# Patient Record
Sex: FEMALE | Race: WHITE | Employment: FULL TIME | URBAN - METROPOLITAN AREA
[De-identification: names, ages, dates, MRNs, and addresses within clinical notes are randomized per-mention and may not be internally consistent; named-entity substitution may affect disease eponyms.]

---

## 2018-04-19 ENCOUNTER — HOSPITAL ENCOUNTER (OUTPATIENT)
Dept: GENERAL RADIOLOGY | Age: 58
Discharge: HOME | End: 2018-04-19
Attending: INTERNAL MEDICINE
Payer: COMMERCIAL

## 2018-04-19 ENCOUNTER — HOSPITAL ENCOUNTER (OUTPATIENT)
Dept: CT IMAGING | Age: 58
Discharge: HOME | End: 2018-04-19
Attending: INTERNAL MEDICINE
Payer: COMMERCIAL

## 2018-04-19 DIAGNOSIS — R59.0 CERVICAL LYMPHADENOPATHY: ICD-10-CM

## 2018-04-19 DIAGNOSIS — R59.0 LOCALIZED ENLARGED LYMPH NODES: ICD-10-CM

## 2018-04-19 DIAGNOSIS — R61 GENERALIZED HYPERHIDROSIS: ICD-10-CM

## 2018-04-19 DIAGNOSIS — G44.029 CHRONIC CLUSTER HEADACHE, NOT INTRACTABLE: ICD-10-CM

## 2018-04-19 PROCEDURE — 71046 X-RAY EXAM CHEST 2 VIEWS: CPT

## 2018-04-19 PROCEDURE — 70450 CT HEAD/BRAIN W/O DYE: CPT

## 2018-08-14 ENCOUNTER — OFFICE VISIT (OUTPATIENT)
Dept: ORTHOPEDIC SURGERY | Age: 58
End: 2018-08-14

## 2018-08-14 VITALS
BODY MASS INDEX: 26.31 KG/M2 | SYSTOLIC BLOOD PRESSURE: 130 MMHG | HEIGHT: 60 IN | WEIGHT: 134 LBS | RESPIRATION RATE: 16 BRPM | OXYGEN SATURATION: 97 % | TEMPERATURE: 97.6 F | HEART RATE: 76 BPM | DIASTOLIC BLOOD PRESSURE: 64 MMHG

## 2018-08-14 DIAGNOSIS — M25.512 LEFT SHOULDER PAIN, UNSPECIFIED CHRONICITY: ICD-10-CM

## 2018-08-14 DIAGNOSIS — M75.02 ADHESIVE CAPSULITIS OF LEFT SHOULDER: Primary | ICD-10-CM

## 2018-08-14 RX ORDER — DICLOFENAC SODIUM 10 MG/G
2 GEL TOPICAL 4 TIMES DAILY
Qty: 1 EACH | Refills: 1 | Status: SHIPPED | OUTPATIENT
Start: 2018-08-14 | End: 2019-10-28

## 2018-08-14 RX ORDER — FLUOXETINE 20 MG/1
20 TABLET ORAL DAILY
COMMUNITY

## 2018-08-14 RX ORDER — CYANOCOBALAMIN (VITAMIN B-12) 2500 MCG
TABLET, SUBLINGUAL SUBLINGUAL
COMMUNITY

## 2018-08-14 NOTE — PROGRESS NOTES
Feliciano Garcia  1960   Chief Complaint   Patient presents with    Shoulder Pain     left shoulder pain        HISTORY OF PRESENT ILLNESS  Feliciano Garcia is a 61 y.o. female who presents today for evaluation of left shoulder pain. she rates her pain 7/10 today. Pain has been present for 2 months. Denies a specific injury but is very active. Patient describes the pain as sharp that is Intermittent in nature. Symptoms are worse with lifting overhead, certain movements of the arm, Activity and is better with  Rest. Associated symptoms include stiffness. Since problem started, it: has worsened. Pain does wake patient up at night. Has taken no meds for the problem. Has tried following treatments: Injections:NO; Brace:NO; Therapy:NO; Cane/Crutch:NO       Allergies   Allergen Reactions    Iodine Anaphylaxis    Nsaids (Non-Steroidal Anti-Inflammatory Drug) Other (comments)     Unable to take r/t gastric bypass        Past Medical History:   Diagnosis Date    Thyroid disease       Social History     Social History    Marital status:      Spouse name: N/A    Number of children: N/A    Years of education: N/A     Occupational History    Not on file. Social History Main Topics    Smoking status: Former Smoker    Smokeless tobacco: Never Used    Alcohol use No    Drug use: No    Sexual activity: Not on file     Other Topics Concern    Not on file     Social History Narrative      Past Surgical History:   Procedure Laterality Date    HX BREAST AUGMENTATION      lift     HX  SECTION      x2    HX GASTRIC BYPASS      HX HEENT      TMJ Sx     HX OTHER SURGICAL      Several Sx on Scalp from Iodine burn on forehead     HX OTHER SURGICAL      arm lift      History reviewed. No pertinent family history. Current Outpatient Prescriptions   Medication Sig    ginkgo biloba (GINKOBA PO) Take  by mouth.     Liraglutide (VICTOZA) 0.6 mg/0.1 mL (18 mg/3 mL) pnij 0.6 mg by SubCUTAneous route.    FLUoxetine (PROZAC) 20 mg tablet Take 20 mg by mouth daily.  cyanocobalamin, vitamin B-12, (VITAMIN B-12 PO) Take  by mouth.  biotin 5,000 mcg subl by SubLINGual route.  levothyroxine (SYNTHROID) 125 mcg tablet Take  by mouth Daily (before breakfast).  multivitamin (ONE A DAY) tablet Take 1 tablet by mouth daily. No current facility-administered medications for this visit. REVIEW OF SYSTEM   Patient denies: Weight loss, Fever/Chills, HA, Visual changes, Fatigue, Chest pain, SOB, Abdominal pain, N/V/D/C, Blood in stool or urine, Edema. Pertinent positive as above in HPI. All others were negative    PHYSICAL EXAM:   Visit Vitals    /64    Pulse 76    Temp 97.6 °F (36.4 °C) (Oral)    Resp 16    Ht 5' (1.524 m)    Wt 134 lb (60.8 kg)    SpO2 97%    BMI 26.17 kg/m2     The patient is a well-developed, well-nourished female   in no acute distress. The patient is alert and oriented times three. The patient is alert and oriented times three. Mood and affect are normal.  LYMPHATIC: lymph nodes are not enlarged and are within normal limits  SKIN: normal in color and non tender to palpation. There are no bruises or abrasions noted. NEUROLOGICAL: Motor sensory exam is within normal limits. Reflexes are equal bilaterally.  There is normal sensation to pinprick and light touch  MUSCULOSKELETAL:  Examination Left shoulder   Skin Intact   AC joint tenderness -   Biceps tenderness -   Forward flexion/Elevation    Active abduction    Glenohumeral abduction 45   External rotation ROM 45   Internal rotation ROM 30   Apprehension -   Faribas Relocation -   Jerk -   Load and Shift -   Obriens -   Speeds -   Impingement sign -   Supraspinatus/Empty Can -, 5/5   External Rotation Strength -, 5/5   Lift Off/Belly Press -, 5/5   Neurovascular Intact       IMAGING: XR of left shoulder dated 8/14/18 was reviewed and read: mild degenerative changes in the [facility] joint IMPRESSION:      ICD-10-CM ICD-9-CM    1. Adhesive capsulitis of left shoulder M75.02 726.0 REFERRAL TO PHYSICAL THERAPY      diclofenac (VOLTAREN) 1 % gel   2. Left shoulder pain, unspecified chronicity M25.512 719.41 AMB POC XRAY, SHOULDER; COMPLETE, 2+        PLAN:  1. The patient presents today with left shoulder pain due to adhesive capsulitis. Risk factors include: n/a  2. No ultrasound exam indicated today  3. No cortisone injection indicated today   4. Yes Physical Therapy indicated today  5. No diagnostic test indicated today:   6. No durable medical equipment indicated today  7. No referral indicated today   8. Yes medications indicated today: VOLTAREN GEL  9. No Narcotic indicated today       RTC 4-6 weeks  Follow-up Disposition: Not on File    Scribed by Elizabeth Lindsey [address]) as dictated by Aster Linder MD    I, Dr. Aster Linder, confirm that all documentation is accurate.     Aster Linder M.D.   [facility]

## 2018-08-27 ENCOUNTER — HOSPITAL ENCOUNTER (OUTPATIENT)
Dept: PHYSICAL THERAPY | Age: 58
Discharge: HOME | End: 2018-08-27
Payer: COMMERCIAL

## 2018-08-27 PROCEDURE — 97140 MANUAL THERAPY 1/> REGIONS: CPT

## 2018-08-27 PROCEDURE — 97162 PT EVAL MOD COMPLEX 30 MIN: CPT

## 2018-08-27 PROCEDURE — 97110 THERAPEUTIC EXERCISES: CPT

## 2018-08-27 NOTE — PROGRESS NOTES
PT DAILY TREATMENT NOTE 8    Patient Name: Jo Wagner  Date:2018  : 1960  [x]  Patient  Verified  Payor: Yin Johnson / [place] [employer] / Product Type: PPO /    In time:408  Out time:440  Total Treatment Time (min): 32  Visit #: 1 of 8    Treatment Area: Adhesive capsulitis of left shoulder [M75.02]    SUBJECTIVE  Pain Level (0-10 scale): 7  Any medication changes, allergies to medications, adverse drug reactions, diagnosis change, or new procedure performed?: [x] No    [] Yes (see summary sheet for update)  Subjective functional status/changes:   [] No changes reported  Left shoulder pain with difficulty reaching behind her back to zip up her work clothes. OBJECTIVE  8 min Manual Therapy:  Per flow sheet   Rationale: decrease pain, increase ROM and increase tissue extensibility to improve flexion and IR  15 min Therapeutic Exercise:  [x] See flow sheet :   Rationale: increase ROM to improve the patients ability to increase functional reach            With   [] TE   [] TA   [] neuro   [] other: Patient Education: [x] Review HEP    [] Progressed/Changed HEP based on:   [] positioning   [] body mechanics   [] transfers   [] heat/ice application    [] other:      Other Objective/Functional Measures: refer to eval     Pain Level (0-10 scale) post treatment: 5    ASSESSMENT/Changes in Function: Improved pain and mobility after stretching    Patient will continue to benefit from skilled PT services to modify and progress therapeutic interventions, address functional mobility deficits, address ROM deficits, address strength deficits, analyze and address soft tissue restrictions, analyze and cue movement patterns, analyze and modify body mechanics/ergonomics and assess and modify postural abnormalities to attain remaining goals. []  See Plan of Care  []  See progress note/recertification  []  See Discharge Summary         Progress towards goals / Updated goals:  Short Term Goals:  To be accomplished in 2 weeks:  1. Pt will be compliant with HEP 3x/day to improve shoulder flexion and IR to increase ease of dressing  2. Pt will increase FIR of left shoulder to L5 to increase ease of dressing     Long Term Goals: To be accomplished in 4 weeks:  1. Pt will improve FOTO to 70 to increase ease of ADLs  2. Pt will increase FIR to T10 to be able to zip up dresses without assistance  3.   Pt will improve gross left shoulder strength to 3+/5 to increase ease of ADLs    PLAN  []  Upgrade activities as tolerated     [x]  Continue plan of care  []  Update interventions per flow sheet       []  Discharge due to:_  []  Other:_      Judge Ho, PT 8/27/2018  4:48 PM    Future Appointments  Date Time Provider Lilliam Byrnes   8/29/2018 1:30 PM Nell Reyes, PTA [facility]   9/4/2018 2:30 PM Nell Reyes, PTA [facility]   9/6/2018 1:00 PM Alonzo Mónica, PTA [facility]   9/11/2018 2:30 PM Nell Reyes, PTA [facility]   9/13/2018 3:30 PM Alonzo Mónica, PTA [facility]   9/17/2018 3:30 PM Judge Ho, PT [facility]   9/19/2018 2:00 PM Alonzo Mónica, PTA [facility]

## 2018-08-27 NOTE — PROGRESS NOTES
[facility] [address]  [place], [address]  [phone] fax    Plan of Care/ Statement of Necessity for Physical Therapy Services    Patient name: Nilson Andrews Start of Care: 2018   Referral source: Elin Lawson : 1960    Medical Diagnosis: Adhesive capsulitis of left shoulder [M75.02]   Onset Date:3-4 months ago    Treatment Diagnosis: left shoulder pain   Prior Hospitalization: see medical history Provider#: [id number]   Medications: Verified on Patient summary List    Comorbidities: gastric bypass, allergies, knee arthroscopy   Prior Level of Function: RHD female with full AROM (B) shoulders     The Plan of Care and following information is based on the information from the initial evaluation. Assessment/ key information: Pt presents with decreased left shoulder AROM affecting reaching, bathing, and dressing. Pt has poor posterior capsular mobility and likely RTC tendonitis. Continue PT to address the above findings to control pain and increase ease of ADLs  Evaluation Complexity History MEDIUM  Complexity : 1-2 comorbidities / personal factors will impact the outcome/ POC ; Examination MEDIUM Complexity : 3 Standardized tests and measures addressing body structure, function, activity limitation and / or participation in recreation  ;Presentation MEDIUM Complexity : Evolving with changing characteristics  ; Clinical Decision Making MEDIUM Complexity : FOTO score of 26-74  Overall Complexity Rating: MEDIUM  Problem List: pain affecting function, decrease ROM, decrease strength, decrease ADL/ functional abilitiies and decrease activity tolerance   Treatment Plan may include any combination of the following: Therapeutic exercise, Therapeutic activities, Neuromuscular re-education, Physical agent/modality, Gait/balance training, Manual therapy and Patient education  Patient / Family readiness to learn indicated by: asking questions, trying to perform skills and interest  Persons(s) to be included in education: patient (P)  Barriers to Learning/Limitations: None  Patient Goal (s): To be able to zip up my dresses  Patient Self Reported Health Status: good  Rehabilitation Potential: good    Short Term Goals: To be accomplished in 2 weeks:  1. Pt will be compliant with HEP 3x/day to improve shoulder flexion and IR to increase ease of dressing  2. Pt will increase FIR of left shoulder to L5 to increase ease of dressing    Long Term Goals: To be accomplished in 4 weeks:  1. Pt will improve FOTO to 70 to increase ease of ADLs  2. Pt will increase FIR to T10 to be able to zip up dresses without assistance  3. Pt will improve gross left shoulder strength to 3+/5 to increase ease of ADLs     Frequency / Duration: Patient to be seen 2 times per week for 4 weeks. Patient/ Caregiver education and instruction: Diagnosis, prognosis, self care, activity modification and exercises   [x]  Plan of care has been reviewed with MARY Weiss PT 8/27/2018 4:44 PM    ________________________________________________________________________    I certify that the above Therapy Services are being furnished while the patient is under my care. I agree with the treatment plan and certify that this therapy is necessary.     Physician's Signature:____________Date:_________TIME:________    ** Signature, Date and Time must be completed for valid certification **    Please sign and return to [facility] [address]  [facility] [address]  [place], [address]  [phone] fax

## 2018-08-29 ENCOUNTER — HOSPITAL ENCOUNTER (OUTPATIENT)
Dept: PHYSICAL THERAPY | Age: 58
Discharge: HOME | End: 2018-08-29
Payer: COMMERCIAL

## 2018-08-29 PROCEDURE — 97110 THERAPEUTIC EXERCISES: CPT

## 2018-08-29 PROCEDURE — 97140 MANUAL THERAPY 1/> REGIONS: CPT

## 2018-08-29 NOTE — PROGRESS NOTES
PT DAILY TREATMENT NOTE  Patient Name: Hanna Nathan Date:2018 : 1960 [x]  Patient  Verified Payor: Nadira Fall / [place]: [place] [employer] / Product Type: PPO / In time:158  Out time:234 Total Treatment Time (min): 36 Visit #: 2 of 8 Treatment Area: Adhesive capsulitis of left shoulder [M75.02] SUBJECTIVE Pain Level (0-10 scale): 0 at rest; 7 with activity Any medication changes, allergies to medications, adverse drug reactions, diagnosis change, or new procedure performed?: [x] No    [] Yes (see summary sheet for update) Subjective functional status/changes:   [] No changes reported I've been doing my HEP. OBJECTIVE 28 min Therapeutic Exercise:  [] See flow sheet :  
Rationale: increase ROM to improve the patients ability to perform daily activities 8 min Manual Therapy:  Per flow sheet Rationale: decrease pain, increase ROM and increase tissue extensibility to perform ADLs With 
 [] TE 
 [] TA 
 [] neuro 
 [] other: Patient Education: [x] Review HEP [] Progressed/Changed HEP based on:  
[] positioning   [] body mechanics   [] transfers   [] heat/ice application   
[] other:   
 
Other Objective/Functional Measures:   
 
Pain Level (0-10 scale) post treatment: 6 \"achy\" ASSESSMENT/Changes in Function: Unable to tolerate s/l abd 2/2 pain. Poor humeral head inferior mobility noted with passive and active abd. Patient will continue to benefit from skilled PT services to modify and progress therapeutic interventions, address functional mobility deficits, address ROM deficits, address strength deficits, analyze and address soft tissue restrictions and analyze and cue movement patterns to attain remaining goals. []  See Plan of Care 
[]  See progress note/recertification 
[]  See Discharge Summary Progress towards goals / Updated goals: 
Short Term Goals: To be accomplished in 2 weeks: 1.  Pt will be compliant with HEP 3x/day to improve shoulder flexion and IR to increase ease of dressing 2.  Pt will increase FIR of left shoulder to L5 to increase ease of dressing progressing- able to get to L3 with pulleys 
   
Long Term Goals: To be accomplished in 4 weeks: 1.  Pt will improve FOTO to 70 to increase ease of ADLs 2.  Pt will increase FIR to T10 to be able to zip up dresses without assistance 3.  Pt will improve gross left shoulder strength to 3+/5 to increase ease of ADLs PLAN 
[]  Upgrade activities as tolerated     []  Continue plan of care 
[]  Update interventions per flow sheet      
[]  Discharge due to:_ 
[]  Other:_ Ab Savage, PT 8/29/2018  2:05 PM 
 
Future Appointments Date Time Provider Lilliam Byrnes 9/4/2018 2:30 PM Shimon Reyes, MARY [facility]  
9/6/2018 1:00 PM [facility], PTA [facility]  
9/11/2018 2:30 PM Shimon Reyes, PTA [facility]  
9/13/2018 3:30 PM [facility], PTA [facility]  
9/19/2018 2:00 PM [facility], PTA [facility]  
9/21/2018 12:00 PM Shaheen Morgan, PT [facility]

## 2018-09-06 ENCOUNTER — HOSPITAL ENCOUNTER (OUTPATIENT)
Dept: PHYSICAL THERAPY | Age: 58
Discharge: HOME | End: 2018-09-06
Payer: COMMERCIAL

## 2018-09-06 PROCEDURE — 97110 THERAPEUTIC EXERCISES: CPT

## 2018-09-06 PROCEDURE — 97140 MANUAL THERAPY 1/> REGIONS: CPT

## 2018-09-06 NOTE — PROGRESS NOTES
PT DAILY TREATMENT NOTE  Patient Name: Facundo Leos Date:2018 : 1960 [x]  Patient  Verified Payor: Iraida Llamas / [place]: [place] [employer] / Product Type: PPO / In time:1:00  Out time:1:33 Total Treatment Time (min): 33 Visit #: 3 of 8 Treatment Area: Adhesive capsulitis of left shoulder [M75.02] SUBJECTIVE Pain Level (0-10 scale): 7/10 Any medication changes, allergies to medications, adverse drug reactions, diagnosis change, or new procedure performed?: [x] No    [] Yes (see summary sheet for update) Subjective functional status/changes:   [] No changes reported Pt reports no new complaints of pain. OBJECTIVE 25 min Therapeutic Exercise:  [] See flow sheet :  
Rationale: increase ROM and increase strength to improve the patients ability to tolerate ADLs. 8 min Manual Therapy:  PROM to left shoulder, GHJ inferior/posterior mobs. Rationale: decrease pain, increase ROM and increase tissue extensibility to improve tolerance to ADLs. With 
 [] TE 
 [] TA 
 [] neuro 
 [] other: Patient Education: [x] Review HEP [] Progressed/Changed HEP based on:  
[] positioning   [] body mechanics   [] transfers   [] heat/ice application   
[] other:   
 
Other Objective/Functional Measures: Pain with end range shoulder abduction. Pain Level (0-10 scale) post treatment: 5/10 ASSESSMENT/Changes in Function: Pt demonstrates good available shoulder flexion but continues to have limitations through IR and abduction. Patient will continue to benefit from skilled PT services to modify and progress therapeutic interventions, address functional mobility deficits, address ROM deficits, address strength deficits, analyze and address soft tissue restrictions, analyze and cue movement patterns and analyze and modify body mechanics/ergonomics to attain remaining goals. []  See Plan of Care 
[]  See progress note/recertification 
[]  See Discharge Summary Progress towards goals / Updated goals: 
Short Term Goals: To be accomplished in 2 weeks: 1.  Pt will be compliant with HEP 3x/day to improve shoulder flexion and IR to increase ease of dressing- Pt reports compliance with HEP. 9/6/18 2.  Pt will increase FIR of left shoulder to L5 to increase ease of dressing progressing- able to get to L3 with pulleys 
   
Long Term Goals: To be accomplished in 4 weeks: 1.  Pt will improve FOTO to 70 to increase ease of ADLs 2.  Pt will increase FIR to T10 to be able to zip up dresses without assistance 3.  Pt will improve gross left shoulder strength to 3+/5 to increase ease of ADLs 
  
PLAN 
[]  Upgrade activities as tolerated     [x]  Continue plan of care 
[]  Update interventions per flow sheet      
[]  Discharge due to:_ 
[]  Other:_ Kortney Kan PTA 9/6/2018  1:05 PM 
 
Future Appointments Date Time Provider Lilliam Byrnes 9/11/2018 2:30 PM Destinee Reyes, PTA [facility]  
9/13/2018 3:30 PM Kortney Kan PTA [facility]  
9/19/2018 2:00 PM Kortney Kan, PTA [facility]  
9/21/2018 12:00 PM Portillo Jones, PT [facility]  
9/25/2018 4:30 PM Kortney Kan, PTA [facility]

## 2018-09-11 ENCOUNTER — HOSPITAL ENCOUNTER (OUTPATIENT)
Dept: PHYSICAL THERAPY | Age: 58
Discharge: HOME | End: 2018-09-11
Payer: COMMERCIAL

## 2018-09-11 PROCEDURE — 97110 THERAPEUTIC EXERCISES: CPT

## 2018-09-11 PROCEDURE — 97140 MANUAL THERAPY 1/> REGIONS: CPT

## 2018-09-11 NOTE — PROGRESS NOTES
PT DAILY TREATMENT NOTE  Patient Name: Ruthy Parker Date:2018 : 1960 [x]  Patient  Verified Payor: Jo Ann Calixto / [place]: [place] [employer] / Product Type: PPO / In time:1:57  Out time:2:43 Total Treatment Time (min): 46 Visit #: 4 of 8 Treatment Area: Adhesive capsulitis of left shoulder [M75.02] SUBJECTIVE Pain Level (0-10 scale): 6 Any medication changes, allergies to medications, adverse drug reactions, diagnosis change, or new procedure performed?: [x] No    [] Yes (see summary sheet for update) Subjective functional status/changes:   [] No changes reported Pt reports feeling a little better today OBJECTIVE 38 min Therapeutic Exercise:  [x] See flow sheet :  
Rationale: increase ROM and increase strength to improve the patients ability to perform ADLs 8 min Manual Therapy:  Post/if GH jt mobs, DTM + TPR to medial border of scap Rationale: decrease pain, increase ROM, increase tissue extensibility and decrease trigger points to perform daily tasks With 
 [] TE 
 [] TA 
 [] neuro 
 [] other: Patient Education: [x] Review HEP [] Progressed/Changed HEP based on:  
[] positioning   [] body mechanics   [] transfers   [] heat/ice application   
[] other:   
 
Other Objective/Functional Measures:  
Difficulty with stretching into abd due to reports of \"getting stuck\" Pain Level (0-10 scale) post treatment: 5 
 
ASSESSMENT/Changes in Function: Held S/L active abd due to pt reports of shoulder getting \"stuck\". Demo's good tolerance to passive stretching.   
 
Patient will continue to benefit from skilled PT services to modify and progress therapeutic interventions, address functional mobility deficits, address ROM deficits, analyze and address soft tissue restrictions, analyze and cue movement patterns and assess and modify postural abnormalities to attain remaining goals. []  See Plan of Care 
[]  See progress note/recertification 
[]  See Discharge Summary Progress towards goals / Updated goals: 
Short Term Goals: To be accomplished in 2 weeks: 1.  Pt will be compliant with HEP 3x/day to improve shoulder flexion and IR to increase ease of dressing- Pt reports compliance with HEP. 9/6/18 2.  Pt will increase FIR of left shoulder to L5 to increase ease of dressing progressing- able to get to L3 with pulleys 
   
Long Term Goals: To be accomplished in 4 weeks: 1.  Pt will improve FOTO to 70 to increase ease of ADLs 2.  Pt will increase FIR to T10 to be able to zip up dresses without assistance 3.  Pt will improve gross left shoulder strength to 3+/5 to increase ease of ADLs PLAN [x]  Upgrade activities as tolerated     [x]  Continue plan of care 
[]  Update interventions per flow sheet      
[]  Discharge due to:_ 
[]  Other:_ Marybeth Silver PTA 9/11/2018  1:59 PM 
 
Future Appointments Date Time Provider Lilliam Byrnes 9/11/2018 2:00 PM Syeda Reyes [facility]  
9/13/2018 3:30 PM [facility]  
9/19/2018 2:00 PM [facility]  
9/21/2018 12:00 [facility]  
9/25/2018 4:30 [facility]

## 2018-09-13 ENCOUNTER — HOSPITAL ENCOUNTER (OUTPATIENT)
Dept: PHYSICAL THERAPY | Age: 58
Discharge: HOME | End: 2018-09-13
Payer: COMMERCIAL

## 2018-09-13 PROCEDURE — 97110 THERAPEUTIC EXERCISES: CPT

## 2018-09-13 PROCEDURE — 97112 NEUROMUSCULAR REEDUCATION: CPT

## 2018-09-13 PROCEDURE — 97140 MANUAL THERAPY 1/> REGIONS: CPT

## 2018-09-13 NOTE — PROGRESS NOTES
PT DAILY TREATMENT NOTE  Patient Name: Roland Cochran Date:2018 : 1960 [x]  Patient  Verified Payor: Mike Blum / Plan: [place] [employer] [place] / Product Type: PPO / In time:12:05  Out time:12:45 Total Treatment Time (min): 40 Visit #: 5 of 8 Treatment Area: Adhesive capsulitis of left shoulder [M75.02] SUBJECTIVE Pain Level (0-10 scale): 0 at rest, 6 when active Any medication changes, allergies to medications, adverse drug reactions, diagnosis change, or new procedure performed?: [x] No    [] Yes (see summary sheet for update) Subjective functional status/changes:   [] No changes reported Pt notes that her shoulder continues to hurt with general UE movements. OBJECTIVE 20 min Therapeutic Exercise:  [x] See flow sheet :  
Rationale: increase ROM, increase strength and improve coordination to improve the patients ability to increase ease with functional tasks 10 min Neuromuscular Re-education:  [x]  See flow sheet :scapular stabilization exercises Rationale: increase strength, improve coordination and increase proprioception  to improve the patients ability to increase ease with overhead reaching 10 min Manual Therapy:  Posterior and inferior HG jt mobs grade 3, PROM in all directions with distraction, STM to scapular musculature Rationale: decrease pain, increase ROM and increase tissue extensibility to increase ease with ADLs With 
 [] TE 
 [] TA 
 [] neuro 
 [] other: Patient Education: [x] Review HEP [] Progressed/Changed HEP based on:  
[] positioning   [] body mechanics   [] transfers   [] heat/ice application   
[] other:   
 
  
 
Pain Level (0-10 scale) post treatment: 0 at rest, 5 with movement ASSESSMENT/Changes in Function: Pt instructed in scapular setting to decrease catching when performing S/L exercises. Pt notes that that it helped the movement seem more smooth and controled.  Pt continues to have good tolerance with manual and active stretching. Patient will continue to benefit from skilled PT services to modify and progress therapeutic interventions, address functional mobility deficits, address ROM deficits, address strength deficits, analyze and address soft tissue restrictions, analyze and cue movement patterns, analyze and modify body mechanics/ergonomics and instruct in home and community integration to attain remaining goals. [x]  See Plan of Care 
[]  See progress note/recertification 
[]  See Discharge Summary Progress towards goals / Updated goals: 
 
Short Term Goals: To be accomplished in 2 weeks: 1.  Pt will be compliant with HEP 3x/day to improve shoulder flexion and IR to increase ease of dressing- Pt reports compliance with HEP. 9/6/18 2.  Pt will increase FIR of left shoulder to L5 to increase ease of dressing progressing- able to get to L3 with pulleys 
   
Long Term Goals: To be accomplished in 4 weeks: 1.  Pt will improve FOTO to 70 to increase ease of ADLs 2.  Pt will increase FIR to T10 to be able to zip up dresses without assistance 3.  Pt will improve gross left shoulder strength to 3+/5 to increase ease of ADLs 
  
 
PLAN [x]  Upgrade activities as tolerated     [x]  Continue plan of care 
[]  Update interventions per flow sheet      
[]  Discharge due to:_ 
[]  Other:_ Maverick Wooten 9/13/2018  12:46 PM 
 
Future Appointments Date Time Provider Lilliam Byrnes 9/19/2018 2:00 PM Kortney Kan PTA [facility]  
9/21/2018 12:00 PM [facility]  
9/25/2018 4:30 PM Kortney Kan PTA [facility]

## 2018-09-17 ENCOUNTER — APPOINTMENT (OUTPATIENT)
Dept: PHYSICAL THERAPY | Age: 58
End: 2018-09-17
Payer: COMMERCIAL

## 2018-09-19 ENCOUNTER — HOSPITAL ENCOUNTER (OUTPATIENT)
Dept: PHYSICAL THERAPY | Age: 58
Discharge: HOME | End: 2018-09-19
Payer: COMMERCIAL

## 2018-09-19 PROCEDURE — 97110 THERAPEUTIC EXERCISES: CPT

## 2018-09-19 PROCEDURE — 97140 MANUAL THERAPY 1/> REGIONS: CPT

## 2018-09-19 NOTE — PROGRESS NOTES
PT DAILY TREATMENT NOTE  Patient Name: Cindy Arce Date:2018 : 1960 [x]  Patient  Verified Payor: Michael Markham / Plan: [place] [employer] / Product Type: PPO / In time:2:10  Out time:2:45 Total Treatment Time (min): 35 Visit #: 6 of 8 Treatment Area: Adhesive capsulitis of left shoulder [M75.02] SUBJECTIVE Pain Level (0-10 scale): 4/10 Any medication changes, allergies to medications, adverse drug reactions, diagnosis change, or new procedure performed?: [x] No    [] Yes (see summary sheet for update) Subjective functional status/changes:   [] No changes reported Pt reports no new complaints of pain. Pt reports compliance with HEP. OBJECTIVE 27 min Therapeutic Exercise:  [x] See flow sheet :  
Rationale: increase ROM and increase strength to improve the patients ability to tolerate ADLs. 8 min Manual Therapy:  PROM to left shoulder Rationale: decrease pain, increase ROM and increase tissue extensibility to improve tolerance to ADLs. With 
 [] TE 
 [] TA 
 [] neuro 
 [] other: Patient Education: [x] Review HEP [] Progressed/Changed HEP based on:  
[] positioning   [] body mechanics   [] transfers   [] heat/ice application   
[] other:   
 
Other Objective/Functional Measures: Pt continues to perform shoulder hike to achieve shoulder abduction. Pain Level (0-10 scale) post treatment: 0/10 ASSESSMENT/Changes in Function: Pt continues to demonstrate right rotator cuff weakness. Patient will continue to benefit from skilled PT services to modify and progress therapeutic interventions, address functional mobility deficits, address ROM deficits, address strength deficits, analyze and address soft tissue restrictions, analyze and cue movement patterns and analyze and modify body mechanics/ergonomics to attain remaining goals. []  See Plan of Care 
[]  See progress note/recertification 
[]  See Discharge Summary Progress towards goals / Updated goals: 
  
Short Term Goals: To be accomplished in 2 weeks: 1.  Pt will be compliant with HEP 3x/day to improve shoulder flexion and IR to increase ease of dressing- Pt reports compliance with HEP. 9/6/18 2.  Pt will increase FIR of left shoulder to L5 to increase ease of dressing progressing- able to get to L3 with pulleys 
   
Long Term Goals: To be accomplished in 4 weeks: 1.  Pt will improve FOTO to 70 to increase ease of ADLs 2.  Pt will increase FIR to T10 to be able to zip up dresses without assistance- progressing with pulleys. 9/19/18 3.  Pt will improve gross left shoulder strength to 3+/5 to increase ease of ADLs PLAN 
[]  Upgrade activities as tolerated     [x]  Continue plan of care 
[]  Update interventions per flow sheet      
[]  Discharge due to:_ 
[]  Other:_ Juan Phelan PTA 9/19/2018  2:15 PM 
 
Future Appointments Date Time Provider Lilliam Byrnes 9/21/2018 12:00 PM [facility]  
9/25/2018 4:30 PM [facility]

## 2018-09-21 ENCOUNTER — HOSPITAL ENCOUNTER (OUTPATIENT)
Dept: PHYSICAL THERAPY | Age: 58
Discharge: HOME | End: 2018-09-21
Payer: COMMERCIAL

## 2018-09-21 PROCEDURE — 97140 MANUAL THERAPY 1/> REGIONS: CPT

## 2018-09-21 PROCEDURE — 97110 THERAPEUTIC EXERCISES: CPT

## 2018-09-21 NOTE — PROGRESS NOTES
PT DAILY TREATMENT NOTE  Patient Name: Roland Cochran Date:2018 : 1960 [x]  Patient  Verified Payor: Mike Blum / Plan: [employer] / Product Type: PPO / In time:12:05  Out time:12:45 Total Treatment Time (min): 40 Visit #: 7 of 8 Treatment Area: Adhesive capsulitis of left shoulder [M75.02] SUBJECTIVE Pain Level (0-10 scale): 4/10 Any medication changes, allergies to medications, adverse drug reactions, diagnosis change, or new procedure performed?: [x] No    [] Yes (see summary sheet for update) Subjective functional status/changes:   [x] No changes reported OBJECTIVE 30 min Therapeutic Exercise:  [x] See flow sheet :  
Rationale: increase ROM, increase strength and increase proprioception to improve the patients ability to perform ADL's. 10 min Manual Therapy:  Left ST/GH joint mobs, DTM/TPR Left UT, lev scap, infraspinatus, pec major/minor. Shoulder PROM. Rationale: decrease pain, increase ROM, increase tissue extensibility and decrease trigger points to improve ease of [place] activities. With 
 [x] TE 
 [] TA 
 [] neuro 
 [] other: Patient Education: [x] Review HEP [] Progressed/Changed HEP based on:  
[] positioning   [] body mechanics   [] transfers   [] heat/ice application   
[] other:   
 
Other Objective/Functional Measures: No change in there ex. Pain Level (0-10 scale) post treatment: 0/10 ASSESSMENT/Changes in Function:  FOTO 61%. TTP Left pec major/minor. Pt reports improved ease with ADL's, limited abd ROM. Patient will continue to benefit from skilled PT services to modify and progress therapeutic interventions, address functional mobility deficits, address ROM deficits, address strength deficits, analyze and address soft tissue restrictions and analyze and modify body mechanics/ergonomics to attain remaining goals. [x]  See Plan of Care 
[]  See progress note/recertification 
[]  See Discharge Summary Progress towards goals / Updated goals: 
Short Term Goals: To be accomplished in 2 weeks: 1.  Pt will be compliant with HEP 3x/day to improve shoulder flexion and IR to increase ease of dressing- Pt reports compliance with HEP. 9/6/18 2.  Pt will increase FIR of left shoulder to L5 to increase ease of dressing progressing- able to get to L3 with pulleys 
   
Long Term Goals: To be accomplished in 4 weeks: 1.  Pt will improve FOTO to 70 to increase ease of ADLs - FOTO 61%. 9/21/2018 2.  Pt will increase FIR to T10 to be able to zip up dresses without assistance- progressing with pulleys. 9/19/18 3.  Pt will improve gross left shoulder strength to 3+/5 to increase ease of ADLs PLAN 
[]  Upgrade activities as tolerated     [x]  Continue plan of care 
[]  Update interventions per flow sheet      
[]  Discharge due to:_ 
[]  Other:_   
 
Tarun Ann PTA 9/21/2018  12:13 PM 
 
Future Appointments Date Time Provider Lilliam Byrnes 9/25/2018 4:30 PM Alonzo Sales PTA [facility]

## 2018-09-25 ENCOUNTER — APPOINTMENT (OUTPATIENT)
Dept: PHYSICAL THERAPY | Age: 58
End: 2018-09-25
Payer: COMMERCIAL

## 2018-09-27 ENCOUNTER — HOSPITAL ENCOUNTER (OUTPATIENT)
Dept: PHYSICAL THERAPY | Age: 58
Discharge: HOME | End: 2018-09-27
Payer: COMMERCIAL

## 2018-09-27 PROCEDURE — 97140 MANUAL THERAPY 1/> REGIONS: CPT

## 2018-09-27 PROCEDURE — 97110 THERAPEUTIC EXERCISES: CPT

## 2018-09-27 NOTE — PROGRESS NOTES
PT DAILY TREATMENT NOTE  Patient Name: Maira Franco Date:2018 : 1960 [x]  Patient  Verified Payor: Sean Farm / Plan: [employer] PPO / Product Type: PPO / In time:10:30  Out time:11:21 Total Treatment Time (min): 51 Visit #: 8 of 8 Treatment Area: Adhesive capsulitis of left shoulder [M75.02] SUBJECTIVE Pain Level (0-10 scale): 3-4/10 Any medication changes, allergies to medications, adverse drug reactions, diagnosis change, or new procedure performed?: [x] No    [] Yes (see summary sheet for update) Subjective functional status/changes:   [] No changes reported Pt reports feeling like she is getting better but is still having difficulty reaching items on printer behind her at work OBJECTIVE 41 min Therapeutic Exercise:  [x] See flow sheet :  
Rationale: increase ROM and increase strength to improve the patients ability to perform ADLs 10 min Manual Therapy:  scap mobs and DTM to medial border of scap, TFM @ bicep tendon, GH jt mobs with PROM Rationale: decrease pain, increase ROM, increase tissue extensibility and decrease trigger points to increase ease with ADLs With 
 [] TE 
 [] TA 
 [] neuro 
 [] other: Patient Education: [x] Review HEP [] Progressed/Changed HEP based on:  
[] positioning   [] body mechanics   [] transfers   [] heat/ice application   
[] other:   
 
Other Objective/Functional Measures:   
Still unable to reach bra strap and printer that sits behind her MMT L shoulder flex 3+/5, abd and IR 3/5 Pain Level (0-10 scale) post treatment: 0 
 
ASSESSMENT/Changes in Function: Pt is making good progress in PT and now reports being able to sleep on L side without pain. Pt cont's to be limited with L shoulder AROM in all planes but fahad IR and abd with pain at all end ranges. L shoulder strength has improved but is also limited with abd and IR.  Discussed plans to cont PT for 2-3 wks to cont strengthening program and focus on improved IR to incr ease with dressing and work related activities. Patient will continue to benefit from skilled PT services to modify and progress therapeutic interventions, address functional mobility deficits, address ROM deficits, address strength deficits, analyze and address soft tissue restrictions and analyze and cue movement patterns to attain remaining goals. []  See Plan of Care 
[]  See progress note/recertification 
[]  See Discharge Summary Progress towards goals / Updated goals: 
Short Term Goals: To be accomplished in 2 weeks: 1.  Pt will be compliant with HEP 3x/day to improve shoulder flexion and IR to increase ease of dressing- Pt reports compliance with HEP. 9/6/18 2.  Pt will increase FIR of left shoulder to L5 to increase ease of dressing progressing- able to get to L3 with pulleys 
   
Long Term Goals: To be accomplished in 4 weeks: 1.  Pt will improve FOTO to 70 to increase ease of ADLs - FOTO 61%. 9/21/2018 2.  Pt will increase FIR to T10 to be able to zip up dresses without assistance- progressing- FIR to T12 with pain throughout range 9/27/18 3.  Pt will improve gross left shoulder strength to 3+/5 to increase ease of ADLs Progressing-  MMT L shoulder flex 3+/5, abd and IR 3/5 PLAN [x]  Upgrade activities as tolerated     [x]  Continue plan of care 
[]  Update interventions per flow sheet      
[]  Discharge due to:_ 
[]  Other:_ Sheeba Reyes, PTA 9/27/2018  10:33 AM 
 
No future appointments.

## 2018-10-01 ENCOUNTER — OFFICE VISIT (OUTPATIENT)
Dept: ORTHOPEDIC SURGERY | Age: 58
End: 2018-10-01

## 2018-10-01 VITALS
SYSTOLIC BLOOD PRESSURE: 138 MMHG | TEMPERATURE: 97.7 F | HEIGHT: 60 IN | OXYGEN SATURATION: 98 % | RESPIRATION RATE: 16 BRPM | DIASTOLIC BLOOD PRESSURE: 85 MMHG | BODY MASS INDEX: 26.11 KG/M2 | WEIGHT: 133 LBS | HEART RATE: 67 BPM

## 2018-10-01 DIAGNOSIS — M75.02 ADHESIVE CAPSULITIS OF LEFT SHOULDER: Primary | ICD-10-CM

## 2018-10-01 NOTE — PROGRESS NOTES
Keny Garza  1960   Chief Complaint   Patient presents with    Shoulder Pain     LEFT SHOULDER F/U        HISTORY OF PRESENT ILLNESS  Keny Garza is a 61 y.o. female who presents today for reevaluation of left shoulder pain. Patient rates pain as 4/10 today. Pain has been present for 3 months. Denies a specific injury but is very active. The patient has been attending PT which has provided good relief. Still some pain and stiffness with certain movements of the arm, overhead reaching. Patient denies any fever, chills, chest pain, shortness of breath or calf pain. The remainder of the review of systems in negative. There are no new illness or injuries to report since last seen in the office. There are no changes to medications, allergies, family or social history. PHYSICAL EXAM:   Visit Vitals    /85    Pulse 67    Temp 97.7 °F (36.5 °C) (Oral)    Resp 16    Ht 5' (1.524 m)    Wt 133 lb (60.3 kg)    SpO2 98%    BMI 25.97 kg/m2     The patient is a well-developed, well-nourished female   in no acute distress. The patient is alert and oriented times three. The patient is alert and oriented times three. Mood and affect are normal.  LYMPHATIC: lymph nodes are not enlarged and are within normal limits  SKIN: normal in color and non tender to palpation. There are no bruises or abrasions noted. NEUROLOGICAL: Motor sensory exam is within normal limits. Reflexes are equal bilaterally.  There is normal sensation to pinprick and light touch  MUSCULOSKELETAL:  Examination Left shoulder   Skin Intact   AC joint tenderness -   Biceps tenderness -   Forward flexion/Elevation    Active abduction    Glenohumeral abduction 80   External rotation ROM 45   Internal rotation ROM 30   Apprehension -   Faribas Relocation -   Jerk -   Load and Shift -   Obriens -   Speeds -   Impingement sign -   Supraspinatus/Empty Can -, 5/5   External Rotation Strength -, 5/5   Lift Off/Belly Press -, 5/5   Neurovascular Intact        IMAGING: XR of left shoulder dated 8/14/18 was reviewed and read: mild degenerative changes in the [facility] joint      IMPRESSION:      ICD-10-CM ICD-9-CM    1. Adhesive capsulitis of left shoulder M75.02 726.0 REFERRAL TO PHYSICAL THERAPY        PLAN:   1. The patient presents today with left shoulder pain due to adhesive capsulitis which has greatly improved with PT. Risk factors include: n/a  2. No ultrasound exam indicated today  3. No cortisone injection indicated today   4. Yes Physical Therapy indicated today  5. No diagnostic test indicated today:   6. No durable medical equipment indicated today  7. No referral indicated today   8. No medications indicated today:   9. No Narcotic indicated today      RTC 3 weeks if pain continues.  Will consider MRI r/o RCT  Follow-up Disposition: Not on File    Scribed by Marquise Valdes [address]) as dictated by BENJIE Case [address] and Spine Specialist

## 2018-10-03 ENCOUNTER — HOSPITAL ENCOUNTER (OUTPATIENT)
Dept: PHYSICAL THERAPY | Age: 58
Discharge: HOME | End: 2018-10-03
Payer: COMMERCIAL

## 2018-10-03 PROCEDURE — 97140 MANUAL THERAPY 1/> REGIONS: CPT

## 2018-10-03 PROCEDURE — 97112 NEUROMUSCULAR REEDUCATION: CPT

## 2018-10-03 PROCEDURE — 97110 THERAPEUTIC EXERCISES: CPT

## 2018-10-03 NOTE — PROGRESS NOTES
PT DAILY TREATMENT NOTE 10-18 Patient Name: Sarah Miller Date:10/3/2018 : 1960 [x]  Patient  Verified Payor: Go Hatch / Plan: [employer] [place] / Product Type: PPO / In time:1030  Out time:1105 Total Treatment Time (min): 35 Visit #: 1 of 8 Treatment Area: Adhesive capsulitis of left shoulder [M75.02] SUBJECTIVE Pain Level (0-10 scale): 0 Any medication changes, allergies to medications, adverse drug reactions, diagnosis change, or new procedure performed?: [x] No    [] Yes (see summary sheet for update) Subjective functional status/changes:   [] No changes reported I only have pain with certain motions. I feel much better but getting dressed is difficult still OBJECTIVE 8 min Manual Therapy: Focused on ER and [facility] Rationale: decrease pain, increase ROM and increase tissue extensibility to increase ease of dressing 27 min Therapeutic Exercise:  [x] See flow sheet :  
Rationale: increase ROM and increase strength to improve the patients ability to increase ease of ADLs With 
 [] TE 
 [] TA 
 [] neuro 
 [] other: Patient Education: [x] Review HEP [] Progressed/Changed HEP based on:  
[] positioning   [] body mechanics   [] transfers   [] heat/ice application   
[] other:   
 
Other Objective/Functional Measures: Pt still has limited ER and stiffness at terminal degrees. FIR is to T10 which is normal but pt wants to be able to reach to T 4 like she can with the right arm. Educated pt to manage expectations with regards to timing and regaining that mobility Pain Level (0-10 scale) post treatment: 0, 3 with activity ASSESSMENT/Changes in Function: Great progress. Will progress into strength based program and plan to DC in 2-4 additional sessions Patient will continue to benefit from skilled PT services to modify and progress therapeutic interventions, address functional mobility deficits, address ROM deficits, address strength deficits, analyze and address soft tissue restrictions, analyze and cue movement patterns and analyze and modify body mechanics/ergonomics to attain remaining goals. []  See Plan of Care 
[]  See progress note/recertification 
[]  See Discharge Summary Progress towards goals / Updated goals: 
Long Term Goals: To be accomplished in 4 weeks: 1.  Pt will improve FOTO to 70 to increase ease of ADLs - FOTO 61%. 9/21/2018 2.  Pt will increase FIR to T10 to be able to zip up dresses without assistance- progressing- FIR to T12 with pain throughout range 9/27/18 3.  Pt will improve gross left shoulder strength to 3+/5 to increase ease of ADLs Progressing-  MMT L shoulder flex 3+/5, abd and IR 3/5 PLAN 
[]  Upgrade activities as tolerated     [x]  Continue plan of care 
[]  Update interventions per flow sheet      
[]  Discharge due to:_ 
[]  Other:_ Adriana Arthur 10/3/2018  12:48 PM 
 
Future Appointments Date Time Provider Lilliam Byrnes 10/5/2018 10:30 AM [facility]  
10/9/2018 11:00 AM Isma Reyes, PTA [facility]  
10/11/2018 10:30 AM Yoon Domingo, [facility]

## 2018-10-03 NOTE — PROGRESS NOTES
In [address]  [address] [place] [address] [place], [address] 
[phone] fax Physical Therapy Progress Note Patient name: Varun Avelar Start of Care: 2018 Referral source: Rita Matt,* : 1960 Medical Diagnosis: Adhesive capsulitis of left shoulder [M75.02] Onset Date:3-4 months ago Treatment Diagnosis: left shoulder pain Prior Hospitalization: see medical history Provider#: [id number] Medications: Verified on Patient summary List  
 Comorbidities: gastric bypass, allergies, knee arthroscopy Prior Level of Function: RHD female with full AROM (B) shoulders Visits from Start of Care: 8    Missed Visits: 0 Key Functional Changes: 
Still unable to reach bra strap and printer that sits behind her MMT L shoulder flex 3+/5, abd and IR 3/5 Pt is making good progress in PT and now reports being able to sleep on L side without pain. Pt cont's to be limited with L shoulder AROM in all planes but fahad IR and abd with pain at all end ranges. L shoulder strength has improved but is also limited with abd and IR. Discussed plans to cont PT for 2-3 wks to cont strengthening program and focus on improved IR to incr ease with dressing and work related activities. Progress towards goals Short Term Goals: To be accomplished in 2 weeks: 1.  Pt will be compliant with HEP 3x/day to improve shoulder flexion and IR to increase ease of dressing- Pt reports compliance with HEP. 18 2.  Pt will increase FIR of left shoulder to L5 to increase ease of dressing progressing- able to get to L3 with pulleys 
   
Long Term Goals: To be accomplished in 4 weeks: 1.  Pt will improve FOTO to 70 to increase ease of ADLs - FOTO 61%. 2018 2.  Pt will increase FIR to T10 to be able to zip up dresses without assistance- progressing- FIR to T12 with pain throughout range 18 3.  Pt will improve gross left shoulder strength to 3+/5 to increase ease of ADLs Progressing-  MMT L shoulder flex 3+/5, abd and IR 3/5 Updated Goals: to be achieved in 4 weeks: 
 Continue working towards unmet LTGs ASSESSMENT/RECOMMENDATIONS: 
[x]Continue therapy per initial plan/protocol at a frequency of  2 x per week for 4 weeks []Continue therapy with the following recommended changes:_____________________      _____________________________________________________________________ []Discontinue therapy progressing towards or have reached established goals []Discontinue therapy due to lack of appreciable progress towards goals []Discontinue therapy due to lack of attendance or compliance []Await Physician's recommendations/decisions regarding therapy []Other:________________________________________________________________ Thank you for this referral.   
Josafat Nolan, PT 10/3/2018 12:47 PM 
NOTE TO PHYSICIAN:  PLEASE COMPLETE THE ORDERS BELOW AND  
FAX TO [facility] Physical Therapy: [phone] If you are unable to process this request in 24 hours please contact our office: [phone] ? I have read the above report and request that my patient continue as recommended. ? I have read the above report and request that my patient continue therapy with the following changes/special instructions:__________________________________________________________ ? I have read the above report and request that my patient be discharged from therapy. Physicians signature: ______________________________Date: ______Time:______

## 2018-10-05 ENCOUNTER — HOSPITAL ENCOUNTER (OUTPATIENT)
Dept: PHYSICAL THERAPY | Age: 58
Discharge: HOME | End: 2018-10-05
Payer: COMMERCIAL

## 2018-10-05 PROCEDURE — 97112 NEUROMUSCULAR REEDUCATION: CPT

## 2018-10-05 PROCEDURE — 97140 MANUAL THERAPY 1/> REGIONS: CPT

## 2018-10-05 PROCEDURE — 97110 THERAPEUTIC EXERCISES: CPT

## 2018-10-05 NOTE — PROGRESS NOTES
PT DAILY TREATMENT NOTE  Patient Name: Lonny Borne Date:10/5/2018 : 1960 [x]  Patient  Verified Payor: Zaire Carrion / Plan: [facility] [employer] / Product Type: PPO / In time:1030  Out time:1113 Total Treatment Time (min): 43 Visit #: 2 of 8 Treatment Area: Adhesive capsulitis of left shoulder [M75.02] SUBJECTIVE Pain Level (0-10 scale): 2 Any medication changes, allergies to medications, adverse drug reactions, diagnosis change, or new procedure performed?: [x] No    [] Yes (see summary sheet for update) Subjective functional status/changes:   [x] No changes reported OBJECTIVE 27 min Therapeutic Exercise:  [] See flow sheet :  
Rationale: increase ROM and increase strength to improve the patients ability to perform Daily activities 8 min Neuromuscular Re-education:  []  See flow sheet :  
Rationale: increase strength  to improve the patients stability for overhead reaching 8 min Manual Therapy:  Per flow sheet Rationale: decrease pain, increase ROM and increase tissue extensibility to perform ADLs With 
 [] TE 
 [] TA 
 [] neuro 
 [] other: Patient Education: [x] Review HEP [] Progressed/Changed HEP based on:  
[] positioning   [] body mechanics   [] transfers   [] heat/ice application   
[] other:   
 
Other Objective/Functional Measures:   
 
Pain Level (0-10 scale) post treatment: 2 
 
ASSESSMENT/Changes in Function: Posterior capsule remains tight at > 120 degrees. Patient will continue to benefit from skilled PT services to modify and progress therapeutic interventions, address functional mobility deficits, address ROM deficits, address strength deficits, analyze and address soft tissue restrictions and analyze and cue movement patterns to attain remaining goals. []  See Plan of Care 
[]  See progress note/recertification 
[]  See Discharge Summary Progress towards goals / Updated goals: Long Term Goals: To be accomplished in 4 weeks: 1.  Pt will improve FOTO to 70 to increase ease of ADLs - FOTO 61%. 9/21/2018 2.  Pt will increase FIR to T10 to be able to zip up dresses without assistance- progressing- FIR to T12 with pain throughout range 9/27/18 3.  Pt will improve gross left shoulder strength to 3+/5 to increase ease of ADLs Progressing-  MMT L shoulder flex 3+/5, abd and IR 3/5 
  
 
PLAN 
[]  Upgrade activities as tolerated     [x]  Continue plan of care 
[]  Update interventions per flow sheet      
[]  Discharge due to:_ 
[]  Other:_ Yazmin Camacho, PT 10/5/2018  10:43 AM 
 
Future Appointments Date Time Provider Lilliam Byrnes 10/9/2018 11:00 AM Andrew Reyes PTA [id number]  
10/11/2018 10:30 AM [facility]

## 2018-10-09 ENCOUNTER — HOSPITAL ENCOUNTER (OUTPATIENT)
Dept: PHYSICAL THERAPY | Age: 58
Discharge: HOME | End: 2018-10-09
Payer: COMMERCIAL

## 2018-10-09 PROCEDURE — 97140 MANUAL THERAPY 1/> REGIONS: CPT

## 2018-10-09 PROCEDURE — 97110 THERAPEUTIC EXERCISES: CPT

## 2018-10-09 NOTE — PROGRESS NOTES
In [address]  Kathia [address] [place], [address] 
[phone] fax Physical Therapy Discharge Summary Patient name: Abisai Fong Start of Care: 18 Referral source: Irasema Queen,* : 1960 Medical/Treatment Diagnosis: Adhesive capsulitis of left shoulder [M75.02] Onset Date:3-4 months ago Prior Hospitalization: see medical history Provider#: [id number] Medications: Verified on Patient Summary List   
Comorbidities: gastric bypass, allergies, knee arthroscopy 
 Prior Level of Function: RHD female with full AROM (B) shoulders Visits from Start of Care: 11    Missed Visits: 0 Reporting Period : 10/3/18 to 10/9/18 Summary of Care: 1.  Pt will improve FOTO to 70 to increase ease of ADLs -  
                      FOTO 69. 10/9/2018 2.  Pt will increase FIR to T10 to be able to zip up dresses without assistance-  
                      MET- FIR T 10 with pain at end range and improved ease with dressing 3.  Pt will improve gross left shoulder strength to 3+/5 to increase ease of ADLs MET- MMT L shoulder 3+/5 ASSESSMENT/RECOMMENDATIONS: 
[x]Discontinue therapy: [x]Patient has reached or is progressing toward set goals []Patient is non-compliant or has abdicated 
    []Due to lack of appreciable progress towards set goals Kaycee Wright, PT 10/9/2018 12:39 PM

## 2018-10-09 NOTE — PROGRESS NOTES
PT DAILY TREATMENT NOTE 10-18 Patient Name: Sara Coughlin Date:10/9/2018 : 1960 [x]  Patient  Verified Payor: Diane Anderson / [place]: [facility] [employer] [facility] / Product Type: PPO / In time:11:02  Out time:11:40 Total Treatment Time (min): 38 Visit #: 3 of 8 Treatment Area: Adhesive capsulitis of left shoulder [M75.02] SUBJECTIVE Pain Level (0-10 scale): 1/10 Any medication changes, allergies to medications, adverse drug reactions, diagnosis change, or new procedure performed?: [x] No    [] Yes (see summary sheet for update) Subjective functional status/changes:   [] No changes reported Pt reports feeling like her range has improved a lot, is able to dress easier and reach for papers on printer without difficulty OBJECTIVE 30 min Therapeutic Exercise:  [x] See flow sheet :  
Rationale: increase ROM and increase strength to improve the patients ability to perform ADLs 8 min Manual Therapy:  Post/inf GH jt mobs Rationale: increase ROM and increase tissue extensibility to increase ease with ADLs With 
 [] TE 
 [] TA 
 [] neuro 
 [] other: Patient Education: [x] Review HEP [] Progressed/Changed HEP based on:  
[] positioning   [] body mechanics   [] transfers   [] heat/ice application   
[] other:   
 
Other Objective/Functional Measures: FOTO- 71 
MMT 3+/5 Pain Level (0-10 scale) post treatment: 1 ASSESSMENT/Changes in Function: Pt performed well with therex today and HEP review. Pt demo's good understanding to technique and to avoid compensations. Pt demo's good motivation to cont with HEP to maintain gains. Pt is readt for D/C from PT at this time. []  See Plan of Care 
[]  See progress note/recertification 
[]  See Discharge Summary Progress towards goals / Updated goals: 
Long Term Goals: To be accomplished in 4 weeks: 1.  Pt will improve FOTO to 70 to increase ease of ADLs -  
 FOTO 69. 10/9/2018 2.  Pt will increase FIR to T10 to be able to zip up dresses without assistance-  
 MET- FIR T 10 with pain at end range and improved ease with dressing 3.  Pt will improve gross left shoulder strength to 3+/5 to increase ease of ADLs MET- MMT L shoulder 3+/5 PLAN 
[]  Upgrade activities as tolerated     []  Continue plan of care 
[]  Update interventions per flow sheet [x]  Discharge due to:_goals met 
[]  Other:_ Aníbal Cuellar PTA 10/9/2018  11:03 AM 
 
Future Appointments Date Time Provider Lilliam Byrnes 10/11/2018 10:30 AM Lucien Mckoy PTA [id number]

## 2018-10-11 ENCOUNTER — APPOINTMENT (OUTPATIENT)
Dept: PHYSICAL THERAPY | Age: 58
End: 2018-10-11
Payer: COMMERCIAL

## 2019-10-28 PROBLEM — Z87.39 HISTORY OF TMJ DISORDER: Status: ACTIVE | Noted: 2019-08-16

## 2019-10-28 PROBLEM — Z98.84 H/O GASTRIC BYPASS: Status: ACTIVE | Noted: 2019-08-16

## 2019-10-28 PROBLEM — Z98.891 HISTORY OF C-SECTION: Status: ACTIVE | Noted: 2019-08-16

## 2020-11-12 ENCOUNTER — OFFICE VISIT (OUTPATIENT)
Dept: ORTHOPEDIC SURGERY | Age: 60
End: 2020-11-12
Payer: COMMERCIAL

## 2020-11-12 VITALS
OXYGEN SATURATION: 98 % | DIASTOLIC BLOOD PRESSURE: 81 MMHG | RESPIRATION RATE: 17 BRPM | TEMPERATURE: 97.5 F | BODY MASS INDEX: 22.04 KG/M2 | HEIGHT: 58 IN | SYSTOLIC BLOOD PRESSURE: 137 MMHG | HEART RATE: 87 BPM | WEIGHT: 105 LBS

## 2020-11-12 DIAGNOSIS — M25.561 RIGHT KNEE PAIN, UNSPECIFIED CHRONICITY: ICD-10-CM

## 2020-11-12 DIAGNOSIS — M17.11 PRIMARY OSTEOARTHRITIS OF RIGHT KNEE: Primary | ICD-10-CM

## 2020-11-12 PROCEDURE — 73560 X-RAY EXAM OF KNEE 1 OR 2: CPT | Performed by: ORTHOPAEDIC SURGERY

## 2020-11-12 PROCEDURE — 20611 DRAIN/INJ JOINT/BURSA W/US: CPT | Performed by: ORTHOPAEDIC SURGERY

## 2020-11-12 PROCEDURE — 99214 OFFICE O/P EST MOD 30 MIN: CPT | Performed by: ORTHOPAEDIC SURGERY

## 2020-11-12 RX ORDER — TRIAMCINOLONE ACETONIDE 40 MG/ML
40 INJECTION, SUSPENSION INTRA-ARTICULAR; INTRAMUSCULAR ONCE
Status: COMPLETED | OUTPATIENT
Start: 2020-11-12 | End: 2020-11-12

## 2020-11-12 RX ADMIN — TRIAMCINOLONE ACETONIDE 40 MG: 40 INJECTION, SUSPENSION INTRA-ARTICULAR; INTRAMUSCULAR at 09:46

## 2020-11-12 NOTE — PROGRESS NOTES
Marixa Otto  1960   Chief Complaint   Patient presents with    Knee Pain     right        HISTORY OF PRESENT ILLNESS  Marixa Otto is a 63 y.o. female who presents today for evaluation of right knee pain. Patient rates pain as 3/10 today. Pain has been present for 1 year. Pain has been getting progressively worse. No injury. She states that it occasionally feels like her leg wants to bend backwards when walking. Pt notes limping. Pain worsens with increased activity. Pt has tried wearing a knee brace. Associated symptom of swelling today. Pain located beneath the knee cap. Pain with squatting and kneeling. Has been taking Tylenol. Pt reports hx of gastric bypass 16 years ago, was also a kidney donor around 6 months ago. Pt is allergic to Iodine. Patient denies any fever, chills, chest pain, shortness of breath or calf pain. The remainder of the review of systems is negative. There are no new illness or injuries to report since last seen in the office. There are no changes to medications, allergies, family or social history. Pain Assessment  11/12/2020   Location of Pain Knee   Location Modifiers Right   Severity of Pain 3   Quality of Pain -   Duration of Pain Persistent   Frequency of Pain Constant   Aggravating Factors Bending;Straightening;Walking;Standing   Limiting Behavior -   Relieving Factors Other (Comment)   Relieving Factors Comment tylenol   Result of Injury No       PHYSICAL EXAM:   Visit Vitals  /81 (BP 1 Location: Left arm)   Pulse 87   Temp 97.5 °F (36.4 °C)   Resp 17   Ht 4' 10\" (1.473 m)   Wt 105 lb (47.6 kg)   SpO2 98%   BMI 21.95 kg/m²     The patient is a well-developed, well-nourished female   in no acute distress. The patient is alert and oriented times three. The patient is alert and oriented times three. Mood and affect are normal.  LYMPHATIC: lymph nodes are not enlarged and are within normal limits  SKIN: normal in color and non tender to palpation.  There are no bruises or abrasions noted. NEUROLOGICAL: Motor sensory exam is within normal limits. Reflexes are equal bilaterally. There is normal sensation to pinprick and light touch  MUSCULOSKELETAL:  Examination Right knee   Skin Intact   Range of motion 0-130   Effusion +   Medial joint line tenderness +   Lateral joint line tenderness -   Tenderness Pes Bursa -   Tenderness insertion MCL -   Tenderness insertion LCL -   Jeannes -   Patella crepitus +   Patella grind +   Lachman -   Pivot shift -   Anterior drawer -   Posterior drawer -   Varus stress -   Valgus stress -   Neurovascular Intact   Calf Swelling and Tenderness to Palpation -   Sherin's Test -   Hamstring Cord Tightness -       PROCEDURE: Right Knee Injection with Ultrasound Guidance  Indication:Right Knee pain/swelling    After sterile prep, 4 cc of Xylocaine and 1 cc of Kenalog were injected into the right knee. Ultrasound images captured using [employer] Ultrasound machine and scanned into patient's chart.        [facility]  OFFICE PROCEDURE PROGRESS NOTE        Chart reviewed for the following:  Kwame Roberts M.D, have reviewed the History, Physical and updated the Allergic reactions for Ephriam Boor performed immediately prior to start of procedure:  Kwame Roberts M.D, have performed the [facility] the start of the procedure:            * Patient was identified by name and date of birth   * Agreement on procedure being performed was verified  * Risks and Benefits explained to the patient  * Procedure site verified and marked as necessary  * Patient was positioned for comfort  * Consent was signed and verified     Time: 9:43 AM     Date of procedure: 11/12/2020    Procedure performed by:  Ernestina Carr M.D    Provider assisted by: (see medication administration)    How tolerated by patient: tolerated the procedure well with no complications    Comments: none      IMAGING: XR of right knee obtained in the office dated 11/12/2020 was reviewed and read by Dr. Jahaira Orlando: Moderate degenerative changes in the medial compartment and patellofemoral joint. IMPRESSION:      ICD-10-CM ICD-9-CM    1. Primary osteoarthritis of right knee  M17.11 715.16 REFERRAL TO PHYSICAL THERAPY      triamcinolone acetonide (KENALOG-40) 40 mg/mL injection 40 mg      US GUIDE INJ/ASP/ARTHRO LG JNT/BURSA   2. Right knee pain, unspecified chronicity  M25.561 719.46 AMB POC XRAY, KNEE; 1/2 VIEWS        PLAN:   1. Pt presents today with right knee pain due to primary OA and I would like to try an injection today. I would also like for her to begin PT. Will see her back in 3 weeks. Risk factors include: no NSAIDS  2. No ultrasound exam indicated today  3. Yes cortisone injection indicated today R KNEE US  4. Yes Physical/Occupational Therapy indicated today  5. No diagnostic test indicated today:   6. No durable medical equipment indicated today  7. No referral indicated today   8. No medications indicated today:   9. No Narcotic indicated today       RTC 3 weeks      Scribed by Wilda Rodrigez [address]) as dictated by Ernestina Carr MD    I, Dr. Ernestina Carr, confirm that all documentation is accurate.     Ernestina Carr M.D.   [address] [place] [address] and Spine Specialist

## 2020-11-18 ENCOUNTER — HOSPITAL ENCOUNTER (OUTPATIENT)
Dept: PHYSICAL THERAPY | Age: 60
Discharge: HOME | End: 2020-11-18
Payer: COMMERCIAL

## 2020-11-18 PROCEDURE — 97110 THERAPEUTIC EXERCISES: CPT

## 2020-11-18 PROCEDURE — 97162 PT EVAL MOD COMPLEX 30 MIN: CPT

## 2020-11-18 PROCEDURE — 97112 NEUROMUSCULAR REEDUCATION: CPT

## 2020-11-18 NOTE — PROGRESS NOTES
[facility]  [address]  [place], [address]  [phone] fax    Plan of Care/ Statement of Necessity for Physical Therapy Services    Patient name: Zohaib Elmore Start of Care: 2020   Referral source: Cami Bowen,* : 1960    Medical Diagnosis: Unilateral primary osteoarthritis, right knee [M17.11]  Payor: OPTIMA / Plan: [place] [employer]  [facility] / Product Type: Commerical /  Onset Date:2019    Treatment Diagnosis: Right Knee Pain   Prior Hospitalization: see medical history Provider#: [id number]   Medications: Verified on Patient summary List    Comorbidities: Arthritis, Headaches, Kidney donation surgery, sleep dysfunction, Gastric Bypass surgery   Prior Level of Function: Independent     The Plan of Care and following information is based on the information from the initial evaluation. Assessment/ key information:   Patient is a 62 yo female presenting with 1 year history of Right knee pain. She had gotten to the point that it was difficulty to squat and kneel two weeks ago. She had a cortisone injection 20 and these acute symptoms resolved. She continues to have mild pain rated 3/10 and reports xrays revealed mild OA. She also reports having a Bakers Cyst which can limit knee bending at times. 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 Knee ROM is in WNL with patella crepitus during quad setting. There is 1/2 cm of swelling present at superior patella, restrictions in gastroc and decreased strength in pelvis, hip and core. She will benefit from PT to improve knee stability and function.      Evaluation Complexity History MEDIUM  Complexity : 1-2 comorbidities / personal factors will impact the outcome/ POC ; Examination MEDIUM Complexity : 3 Standardized tests and measures addressing body structure, function, activity limitation and / or participation in recreation  ;Presentation MEDIUM Complexity : Evolving with changing characteristics  ; Clinical Decision Making MEDIUM Complexity : FOTO score of 26-74  Overall Complexity Rating: MEDIUM  Problem List: pain affecting function, decrease ROM, decrease strength, impaired gait/ balance, decrease activity tolerance and decrease flexibility/ joint mobility   Treatment Plan may include any combination of the following: Therapeutic exercise, Therapeutic activities, Neuromuscular re-education, Physical agent/modality, Gait/balance training, Manual therapy, Patient education and Self Care training  Patient / Family readiness to learn indicated by: asking questions, trying to perform skills and interest  Persons(s) to be included in education: patient (P)  Barriers to Learning/Limitations: None  Patient Goal (s): Pain Relief  Patient Self Reported Health Status: good  Rehabilitation Potential: good    Short Term Goals: To be accomplished in 2 weeks:   1. Patient to be educated and Independent in [address] [place] [address]. 2.  Patient to report no difficulty with walking dog short community distances. Long Term Goals: To be accomplished in 4 weeks:   1. Patient to report FOTO score of at least 74 to demonstrate functional improvement. 2.  Patient to report average pain 0-1/10 with work day activities. 3.  Patient to demonstrate increased hip strength to 5/5 to improve function with yard work/house work. 4.  Patient to demonstrate increased functional quad strength to eliminate Right knee crepitus and prevent increased pain response with squatting activities. Frequency / Duration: Patient to be seen 2  times per week for 4 weeks.     Patient/ Caregiver education and instruction: Diagnosis, prognosis, self care and exercises   [x]  Plan of care has been reviewed with MARY Monge, PT 11/18/2020 4:39 PM    ________________________________________________________________________    I certify that the above Therapy Services are being furnished while the patient is under my care. I agree with the treatment plan and certify that this therapy is necessary.     Physician's Signature:____________Date:_________TIME:________    ** Signature, Date and Time must be completed for valid certification **    Please sign and return to In [address]  [address]  [place], [address]  [phone] fax

## 2020-11-18 NOTE — PROGRESS NOTES
PT DAILY TREATMENT NOTE/KNEE EVAL     Patient Name: Hanna Nathan  Date:2020  : 1960  [x]  Patient  Verified  Payor: Nadira Fall / Plan: [facility] [employer]  [facility] / Product Type: Commerical /    In time:945  Out time:1030  Total Treatment Time (min): 45  Visit #: 1 of 8    Treatment Area: Unilateral primary osteoarthritis, right knee [M17.11]    SUBJECTIVE  Pain Level (0-10 scale): 3-8/10  []constant []intermittent [x]improving []worsening []no change since onset    Any medication changes, allergies to medications, adverse drug reactions, diagnosis change, or new procedure performed?: [x] No    [] Yes (see summary sheet for update)  Subjective functional status/changes:     PLOF: Independent  Limitations to PLOF: Knee pain, OA, crepitus  Mechanism of Injury: No injury  Current symptoms/Complaints: Anterior knee pain that prevents squatting, kneeling, lifting. .posterior knee pain limits bending. Previous Treatment/Compliance: cortisone injection  PMHx/Surgical Hx: Gastric Bypass , Left knee surgery , Kidney donation 2020  Work Hx: Full time Adminstration  Living Situation: Spouse  Pt Goals: \" Pain Releif\"  Barriers: []pain []financial []time []transportation []other None  Motivation: Good      OBJECTIVE/EXAMINATION  Domestic Life: Active with   Activity/Recreational Limitations: Walking, limited yard work    20 min [x]Eval                  []Re-Eval       10 min Therapeutic Exercise:  [x] See flow sheet :   Rationale: increase ROM and increase strength to improve the patients ability to perform ADL's with ease. 15 min Neuromuscular Re-education:  [x]  See flow sheet : TVA and breath education for core control. Rationale: improve coordination, improve balance and increase proprioception  to improve the patients ability to have increased pelvic stability to prevent overuse of knee.            With   [x] TE   [] TA   [] neuro   [] other: Patient Education: [x] Review HEP    [] Progressed/Changed HEP based on:   [x] positioning   [] body mechanics   [] transfers   [] heat/ice application    [] other:      Other Objective/Functional Measures: See [facility]    Physical Therapy Evaluation - Knee    Posture: [] Varus    [x] Valgus    [] Recurvatum        [x] Tibial Torsion    [] Foot Supination    [] Foot Pronation    Describe: Left illium low, Left knee fossa low    Gait:  [x] Normal    [] Abnormal    [] Antalgic    [] NWB    Device:    Describe:  Patient     ROM / Strength  [] Unable to assess                  AROM                Strength (1-5)    Left Right Left Right   Hip Flexion   4 4    Extension   4 4    Abduction   3+ 3+    Adduction       Knee Flexion  0 4 4    Extension  130 4 4   Ankle Plantarflexion   5 5    Dorsiflexion   4 4       Flexibility: [] Unable to assess at this time  Hamstrings:    (L) Tightness= [] WNL   [x] Min   [] Mod   [] Severe    (R) Tightness= [] WNL   [x] Min   [] Mod   [] Severe  Quadriceps:    (L) Tightness= [] WNL   [] Min   [x] Mod   [] Severe    (R) Tightness= [] WNL   [] Min   [x] Mod   [] Severe  Gastroc:      (L) Tightness= [] WNL   [] Min   [x] Mod   [] Severe    (R) Tightness= [] WNL   [] Min   [x] Mod   [] Severe  Other:    Palpation:   Neg/Pos  Neg/Pos  Neg/Pos   Joint Line + Quad tendon - Patellar ligament    Patella Superior lateral + Fibular head - Pes Anserinus    Tibial tubercle  Hamstring tendons + Infrapatellar fat pad      Optional Tests:  Patellar Positioning (Static)   []L []R Normal []L [x]R Lateral   []L []R [place]      []L []R Medial   []L []R [place]    Patellar Tracking   []L []R Glide (Lat)   []L [x]R Tilt (Lat)     []L []R Glide (Med)  []L []R Tilt (Med)      []L []R Tile (Inf)     Patellar Mobility   []L []R Hypermobile []L [x]R Hypomobile     (Pain with mobilization)    Girth Measurements:     Cm at superior patella   Left    Right  . 5 greater       Other tests/comments: TVA: 1/5 Poor       Pain Level (0-10 scale) post treatment: 3/10    ASSESSMENT/Changes in Function:   Patient is a 62 yo female presenting with 1 year history of Right knee pain. She had gotten to the point that it was difficulty to squat and kneel two weeks ago. She had a cortisone injection 11/12/20 and these acute symptoms resolved. She continues to have mild pain rated 3/10 and reports xrays revealed mild OA. She also reports having a Bakers Cyst which can limit knee bending at times. 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 Knee ROM is in WNL with patella crepitus during quad setting. There is 1/2 cm of swelling present at superior patella, restrictions in gastroc and decreased strength in pelvis, hip and core. She will benefit from PT to improve knee stability and function. Patient will continue to benefit from skilled PT services to modify and progress therapeutic interventions, address functional mobility deficits, address ROM deficits, address strength deficits, analyze and address soft tissue restrictions, analyze and cue movement patterns, analyze and modify body mechanics/ergonomics and assess and modify postural abnormalities to attain remaining goals. [x]  See Plan of Care  []  See progress note/recertification  []  See Discharge Summary         Progress towards goals / Updated goals:     Short Term Goals: To be accomplished in 2 weeks:               1.  Patient to be educated and Independent in [address] [place] [address]. EVAL:  Issued HEP               2.  Patient to report no difficulty with walking dog short community distances. EVAL: Mild difficulty  Long Term Goals: To be accomplished in 4 weeks:               1.  Patient to report FOTO score of at least 74 to demonstrate functional improvement. EVAL: FOTO: 68               2.  Patient to report average pain 0-1/10 with work day activities.    EVAL: 3/10               3.  Patient to demonstrate increased hip strength to 5/5 to improve function with yard work/house work. EVAL: Hip Abd 3+/5               4.  Patient to demonstrate increased functional quad strength to eliminate Right knee crepitus and prevent increased pain response with squatting activities.     EVAL: Fair VMO contraction    PLAN  [x]  Upgrade activities as tolerated     [x]  Continue plan of care  []  Update interventions per flow sheet       []  Discharge due to:_  []  Other:_      [facility], PT 11/18/2020  9:52 AM

## 2020-11-24 ENCOUNTER — HOSPITAL ENCOUNTER (OUTPATIENT)
Dept: PHYSICAL THERAPY | Age: 60
Discharge: HOME | End: 2020-11-24
Payer: COMMERCIAL

## 2020-11-24 PROCEDURE — 97110 THERAPEUTIC EXERCISES: CPT

## 2020-11-24 PROCEDURE — 97112 NEUROMUSCULAR REEDUCATION: CPT

## 2020-11-24 NOTE — PROGRESS NOTES
PT DAILY TREATMENT NOTE     Patient Name: Eric Meneses  Date:2020  : 1960  [x]  Patient  Verified  Payor: [employer] / [place] [place]  [facility] Type: Commer[facility] /    In time:1024  Out time:1124  Total Treatment Time (min): 60  Visit #: 2 of 8    Treatment Area: Unilateral primary osteoarthritis, right knee [M17.11]    SUBJECTIVE  Pain Level (0-10 scale): 2  Any medication changes, allergies to medications, adverse drug reactions, diagnosis change, or new procedure performed?: [x] No    [] Yes (see summary sheet for update)  Subjective functional status/changes:   [] No changes reported  Reports cortisone has worn off. States she has had the sensation of her knee feeling like its overextending backwards twice yesterday and that feels like a 10/10 pain in the moment. Most instability occurs when turning corners. Sits down at work with right knee falling into valgus (\"lady positioning for pencil skirt\") followed by crossing the right leg over the left. OBJECTIVE    Modality rationale: decrease edema, decrease inflammation and decrease pain to improve the patients ability to perform ADLs. Min Type Additional Details   10 [x]  Ice     []  heat  []  Ice massage  []  Laser   []  Anodyne Position: reclined  Location: right knee    []  Vasopneumatic Device Pressure:       [] lo [] med [] hi   Temperature: [] lo [] med [] hi   [] Skin assessment post-treatment:  []intact []redness- no adverse reaction    []redness  adverse reaction:     37 min Therapeutic Exercise:  [x] See flow sheet :   Rationale: increase ROM and increase strength to improve the patients ability to perform ADLs. 8 min Neuromuscular Re-education:  [x]  See flow sheet : eccentric step downs, transverse abdominis series. Rationale: increase strength, improve coordination, improve balance and increase proprioception  to improve the patients ability to ambulate with improved stability.             With   [] TE   [] TA   [] neuro   [] other: Patient Education: [x] Review HEP    [] Progressed/Changed HEP based on:   [] positioning   [] body mechanics   [] transfers   [] heat/ice application    [] other:      Other Objective/Functional Measures: exercises initiated for first follow-up per flowsheet       Pain Level (0-10 scale) post treatment: 0    ASSESSMENT/Changes in Function: Pt performs exercises as directed requiring verbal and tactile cueing to reduce knee valgus with step downs and squats to improve knee stability. Will continue to progress at next follow-up. Patient will continue to benefit from skilled PT services to modify and progress therapeutic interventions, address functional mobility deficits, address ROM deficits, address strength deficits, analyze and address soft tissue restrictions, analyze and cue movement patterns, analyze and modify body mechanics/ergonomics, assess and modify postural abnormalities, address imbalance/dizziness and instruct in home and community integration to attain remaining goals. [x]  See Plan of Care  []  See progress note/recertification  []  See Discharge Summary         Progress towards goals / Updated goals:  Short Term Goals: To be accomplished in 2 weeks:  1.  Patient to be educated and Independent in HEP. EVAL:  Issued HEP   Current: met, reports daily compliance (11/24/2020)  2.  Patient to report no difficulty with walking dog short community distances. EVAL: Mild difficulty  Long Term Goals: To be accomplished in 4 weeks:  1.  Patient to report FOTO score of at least 74 to demonstrate functional improvement. EVAL: FOTO: 68  2.  Patient to report average pain 0-1/10 with work day activities. EVAL: 3/10  3.  Patient to demonstrate increased hip strength to 5/5 to improve function with yard work/house work.               EVAL: Hip Abd 3+/5  4.  Patient to demonstrate increased functional quad strength to eliminate Right knee crepitus and prevent increased pain response with squatting activities.                EVAL: Fair VMO contraction    PLAN  []  Upgrade activities as tolerated     [x]  Continue plan of care  []  Update interventions per flow sheet       []  Discharge due to:_  []  Other:_       Chilo Robins, PT 11/24/2020  10:25 AM    Future Appointments   Date Time Provider Lilliam Byrnes   11/24/2020 10:30 AM Adolfo Champagne, PT [facility]   11/27/2020 12:45 PM Adolfo Champagne, PT [facility]   12/1/2020 11:30 AM Dm Castañeda, PTA [facility]   12/3/2020  8:30 AM Kishore Dunn, PTA [facility]   12/3/2020  9:40 AM Dennise Arreola MD [facility]   12/7/2020 11:15 AM Christopher Carlton, PT [facility]   12/10/2020 11:30 AM Adolfo Champagne, PT [facility]   12/14/2020 11:15 AM Javi [facility], PT [facility]

## 2020-11-27 ENCOUNTER — HOSPITAL ENCOUNTER (OUTPATIENT)
Dept: PHYSICAL THERAPY | Age: 60
Discharge: HOME | End: 2020-11-27
Payer: COMMERCIAL

## 2020-11-27 PROCEDURE — 97112 NEUROMUSCULAR REEDUCATION: CPT

## 2020-11-27 PROCEDURE — 97110 THERAPEUTIC EXERCISES: CPT

## 2020-11-27 NOTE — PROGRESS NOTES
PT DAILY TREATMENT NOTE     Patient Name: Kimberly Alvarenga  Date:2020  : 1960  [x]  Patient  Verified  Payor: Marizol Pham / Plan: [place] [employer]  [facility]IT[employer] PT / Product Type: Commerical /    In time:1241  Out time:134  Total Treatment Time (min): 43 billable  Visit #: 3 of 8     Treatment Area: Unilateral primary osteoarthritis, right knee [M17.11]    SUBJECTIVE  Pain Level (0-10 scale): 0  Any medication changes, allergies to medications, adverse drug reactions, diagnosis change, or new procedure performed?: [x] No    [] Yes (see summary sheet for update)  Subjective functional status/changes:   [] No changes reported  No pain today \"but felt it pop last night. \"      OBJECTIVE    Modality rationale: decrease pain to improve the patients ability to ambulate   Min Type Additional Details   10 [x]  Ice     []  heat  []  Ice massage  []  Laser   []  Anodyne Position: reclined  Location: right knee   [] Skin assessment post-treatment:  []intact []redness- no adverse reaction    []redness  adverse reaction:   35 min Therapeutic Exercise:  [x]? See flow sheet :   Rationale: increase ROM and increase strength to improve the patients ability to perform ADLs. 8  min Neuromuscular Re-education:  [x]? See flow sheet : eccentric step downs, SLS, lateral band steps   Rationale: increase strength, improve coordination, improve balance and increase proprioception  to improve the patients ability to ambulate with improved stability.           With   [] TE   [] TA   [] neuro   [] other: Patient Education: [x] Review HEP    [] Progressed/Changed HEP based on:   [] positioning   [] body mechanics   [] transfers   [] heat/ice application    [] other:      Other Objective/Functional Measures: added leg press, lateral band walks, progressed repetitions for therapeutic exercises per flowsheet     Pain Level (0-10 scale) post treatment: 0    ASSESSMENT/Changes in Function: Felt some discomfort at her mason's cyst Anya Chan me a zing\" with strong quad set performance. Performs all exercises as directed pain free otherwise. Pt educated that she may feel muscular soreness due to increased therapeutic exercise intensity. Patient will continue to benefit from skilled PT services to modify and progress therapeutic interventions, address functional mobility deficits, address ROM deficits, address strength deficits, analyze and address soft tissue restrictions, analyze and cue movement patterns, analyze and modify body mechanics/ergonomics, assess and modify postural abnormalities, address imbalance/dizziness and instruct in home and community integration to attain remaining goals. [x]  See Plan of Care  []  See progress note/recertification  []  See Discharge Summary         Progress towards goals / Updated goals:  Short Term Goals: To be accomplished in 2 weeks:  1.  Patient to be educated and Independent in HEP.             EVAL:  Issued HEP              Current: met, reports daily compliance (11/24/2020)  2.  Patient to report no difficulty with walking dog short community distances.               EVAL: Mild difficulty  Long Term Goals: To be accomplished in 4 weeks:  1.  Patient to report FOTO score of at least 74 to demonstrate functional improvement.              EVAL: FOTO: 68  2.  Patient to report average pain 0-1/10 with work day activities.             EVAL: 3/10  3.  Patient to demonstrate increased hip strength to 5/5 to improve function with yard work/house work.              EVAL: Hip Abd 3+/5  4.  Patient to demonstrate increased functional quad strength to eliminate Right knee crepitus and prevent increased pain response with squatting activities.                EVAL: Fair VMO contraction    PLAN  []  Upgrade activities as tolerated     [x]  Continue plan of care  []  Update interventions per flow sheet       []  Discharge due to:_  []  Other:_      [place] Jamie, PT 11/27/2020  12:45 PM    Future Appointments Date Time Provider [facility]   12/1/2020 11:30 AM Solis Dietz, PTA [facility]   12/3/2020  8:30 AM Tisha Mercer, PTA [facility]   12/3/2020  9:40 AM Amaya Arredondo MD [facility]   12/7/2020 11:15 AM Regina Coley, PT [facility]   12/10/2020 11:30 AM Micha Caro, PT [facility]   12/14/2020 11:15 AM Kyrie Caldwell, PT [facility]

## 2020-12-01 ENCOUNTER — HOSPITAL ENCOUNTER (OUTPATIENT)
Dept: PHYSICAL THERAPY | Age: 60
Discharge: HOME | End: 2020-12-01
Payer: COMMERCIAL

## 2020-12-01 PROCEDURE — 97112 NEUROMUSCULAR REEDUCATION: CPT

## 2020-12-01 PROCEDURE — 97110 THERAPEUTIC EXERCISES: CPT

## 2020-12-01 NOTE — PROGRESS NOTES
PT DAILY TREATMENT NOTE     Patient Name: Anurag Ramos  Date:2020  : 1960  [x]  Patient  Verified  Payor: Ace Pierre / Plan: [place] [employer]  [facility] PT / Product Type: Commerical /    In time:11:30  Out time:12:18  Total Treatment Time (min): 48  Visit #: 4 of 8    Treatment Area: Unilateral primary osteoarthritis, right knee [M17.11]    SUBJECTIVE  Pain Level (0-10 scale): 2/10  Any medication changes, allergies to medications, adverse drug reactions, diagnosis change, or new procedure performed?: [x] No    [] Yes (see summary sheet for update)  Subjective functional status/changes:   [] No changes reported  Pt reports no new complaints of pain. Pt reports compliance with HEP. OBJECTIVE           Modality rationale: decrease pain to improve the patients ability to ambulate   Min Type Additional Details    10 [x]? Ice     []?  heat  []? Ice massage  []? Laser   []? Anodyne Position: reclined  Location: right knee    []? Skin assessment post-treatment:  []?intact []? redness- no adverse reaction    []? redness  adverse reaction:     28 min Therapeutic Exercise:  [x] See flow sheet :   Rationale: increase ROM and increase strength to improve the patients ability to perform ADLs with increased ease. 10 min Neuromuscular Re-education:  [x]  See flow sheet :   Rationale: increase strength, improve coordination and increase proprioception  to improve the patients ability to perform ADLs with increased ease.              With   [] TE   [] TA   [] neuro   [] other: Patient Education: [x] Review HEP    [] Progressed/Changed HEP based on:   [] positioning   [] body mechanics   [] transfers   [] heat/ice application    [] other:      Other Objective/Functional Measures: mod vc's for form with squats to avoid valgus     Pain Level (0-10 scale) post treatment: 0/10    ASSESSMENT/Changes in Function: Pt demonstrates improved tolerance to weight bearing activities and demonstrates improved eccentric quad control with step downs. Patient will continue to benefit from skilled PT services to modify and progress therapeutic interventions, address functional mobility deficits, address ROM deficits, address strength deficits, analyze and address soft tissue restrictions, analyze and cue movement patterns and analyze and modify body mechanics/ergonomics to attain remaining goals. []  See Plan of Care  []  See progress note/recertification  []  See Discharge Summary         Progress towards goals / Updated goals:  Short Term Goals: To be accomplished in 2 weeks:  1.  Patient to be educated and Independent in HEP.             EVAL:  Issued HEP              ETRBMYO: met, reports daily compliance (11/24/2020)  2.  Patient to report no difficulty with walking dog short community distances.               EVAL: Mild difficulty  Long Term Goals: To be accomplished in 4 weeks:  1.  Patient to report FOTO score of at least 74 to demonstrate functional improvement.              EVAL: FOTO: 68  2.  Patient to report average pain 0-1/10 with work day activities.             EVAL: 3/10  3.  Patient to demonstrate increased hip strength to 5/5 to improve function with yard work/house work.              EVAL: Hip Abd 3+/5  4.  Patient to demonstrate increased functional quad strength to eliminate Right knee crepitus and prevent increased pain response with squatting activities.                EVAL: Fair VMO contraction    PLAN  []  Upgrade activities as tolerated     [x]  Continue plan of care  []  Update interventions per flow sheet       []  Discharge due to:_  []  Other:_      Yared Hunter PTA 12/1/2020  11:53 AM    Future Appointments   Date Time Provider Lilliam Smarti   12/3/2020  8:30 AM Vivi Giles PTA [facility]   12/3/2020  9:40 AM Irene Montoya MD [facility]   12/7/2020 11:15 AM Elaine Coburn, PT [facility]   12/10/2020 11:30 AM Ressie Maria Elena, PT [facility]   12/14/2020 11:15 AM Deysi Nickerson, PT [facility]

## 2020-12-03 ENCOUNTER — HOSPITAL ENCOUNTER (OUTPATIENT)
Dept: PHYSICAL THERAPY | Age: 60
Discharge: HOME | End: 2020-12-03
Payer: COMMERCIAL

## 2020-12-03 ENCOUNTER — OFFICE VISIT (OUTPATIENT)
Dept: ORTHOPEDIC SURGERY | Age: 60
End: 2020-12-03
Payer: COMMERCIAL

## 2020-12-03 VITALS
DIASTOLIC BLOOD PRESSURE: 69 MMHG | SYSTOLIC BLOOD PRESSURE: 116 MMHG | BODY MASS INDEX: 20.26 KG/M2 | OXYGEN SATURATION: 100 % | TEMPERATURE: 97.5 F | WEIGHT: 103.2 LBS | HEART RATE: 75 BPM | RESPIRATION RATE: 16 BRPM | HEIGHT: 60 IN

## 2020-12-03 DIAGNOSIS — M17.11 PRIMARY OSTEOARTHRITIS OF RIGHT KNEE: Primary | ICD-10-CM

## 2020-12-03 PROCEDURE — 97110 THERAPEUTIC EXERCISES: CPT

## 2020-12-03 PROCEDURE — 97112 NEUROMUSCULAR REEDUCATION: CPT

## 2020-12-03 PROCEDURE — 99212 OFFICE O/P EST SF 10 MIN: CPT | Performed by: ORTHOPAEDIC SURGERY

## 2020-12-03 NOTE — PROGRESS NOTES
PT DAILY TREATMENT NOTE     Patient Name: Anthony Barrientos  Date:12/3/2020  : 1960  [x]  Patient  Verified  Payor: Anthony Salas / Plan: [place] [employer]  [facility]IT[facility] PT / Product Type: Commerical /    In time:8:30  Out time:9:25  Total Treatment Time (min): 55  Visit #: 5 of 8        Treatment Area: Unilateral primary osteoarthritis, right knee [M17.11]    SUBJECTIVE  Pain Level (0-10 scale): 0.5  Any medication changes, allergies to medications, adverse drug reactions, diagnosis change, or new procedure performed?: [x] No    [] Yes (see summary sheet for update)  Subjective functional status/changes:   [] No changes reported  Pt reports she has a f/u appointment for her right knee following treatment today. OBJECTIVE    Modality rationale: decrease inflammation and decrease pain to improve the patients ability to perform ADL's with ease.    Min Type Additional Details    [] Estim:  []Unatt       []IFC  []Premod                        []Other:  []w/ice   []w/heat  Position:  Location:    [] Estim: []Att    []TENS instruct  []NMES                    []Other:  []w/US   []w/ice   []w/heat  Position:  Location:    []  Traction: [] Cervical       []Lumbar                       [] Prone          []Supine                       []Intermittent   []Continuous Lbs:  [] before manual  [] after manual    []  Ultrasound: []Continuous   [] Pulsed                           []1MHz   []3MHz W/cm2:  Location:    []  Iontophoresis with dexamethasone         Location: [] Take home patch   [] In clinic   6 [x]  Ice     []  heat  []  Ice massage  []  Laser   []  Anodyne Position: right knee  Location: longsit    []  Laser with stim  []  Other:  Position:  Location:    []  Vasopneumatic Device Pressure:       [] lo [] med [] hi   Temperature: [] lo [] med [] hi   [] Skin assessment post-treatment:  []intact []redness- no adverse reaction    []redness  adverse reaction:       34 min Therapeutic Exercise:  [x] See flow sheet : Rationale: increase ROM, increase strength and improve coordination to improve the patients ability to perform functional task with ease. 15 min Neuromuscular Re-education:  [x]  See flow sheet :   Rationale: increase strength, improve coordination, improve balance and increase proprioception  to improve the patients ability to perform ADL's with ease. With   [] TE   [] TA   [] neuro   [] other: Patient Education: [x] Review HEP    [] Progressed/Changed HEP based on:   [] positioning   [] body mechanics   [] transfers   [] heat/ice application    [] other:      Other Objective/Functional Measures:      Pain Level (0-10 scale) post treatment: 0    ASSESSMENT/Changes in Function:   Continued improvements in [address] tolerance with pt displaying good static stability noted with no UE use or LOB with SLS and pt reporting no pain. Cueing required to decreased valgus of the right knee with step downs and squats to the plinth with pt displaying fair carryover and reporting no pain with the exercises. Some instability and weakness noted in the B LE's with squats. Continued treatment to strengthen the right knee to aid with ease of ambulation ADL's. Patient will continue to benefit from skilled PT services to modify and progress therapeutic interventions, address functional mobility deficits, address ROM deficits, address strength deficits, analyze and address soft tissue restrictions, analyze and cue movement patterns, analyze and modify body mechanics/ergonomics and assess and modify postural abnormalities to attain remaining goals. [x]  See Plan of Care  []  See progress note/recertification  []  See Discharge Summary         Progress towards goals / Updated goals:  Short Term Goals: To be accomplished in 2 weeks:  1.  Patient to be educated and Independent in HEP.               NACHO:  Issued HEP              GERALDINE: met, reports daily compliance (11/24/2020)  2.  Patient to report no difficulty with walking dog short community distances.               EVAL: Mild difficulty    Current: pt reports improved ease with walking her dog about a mile. Long Term Goals: To be accomplished in 4 weeks:  1.  Patient to report FOTO score of at least 74 to demonstrate functional improvement.              EVAL: FOTO: 68  2.  Patient to report average pain 0-1/10 with work day activities.             EVAL: 3/10   Current: near met 12/3/20 1-2/10 pain on average  3.  Patient to demonstrate increased hip strength to 5/5 to improve function with yard work/house work.  Ibrahima Darryl: Hip Abd 3+/5  4.  Patient to demonstrate increased functional quad strength to eliminate Right knee crepitus and prevent increased pain response with squatting activities.                EVAL: Fair VMO contraction    PLAN  []  Upgrade activities as tolerated     [x]  Continue plan of care  []  Update interventions per flow sheet       []  Discharge due to:_  []  Other:_      Ana Maria Poole, MARY 12/3/2020  8:30 AM    Future Appointments   Date Time Provider Lilliam Byrnes   12/3/2020  9:40 AM Kelly Borges MD [facility]   12/7/2020 11:15 AM Trisha Rowe, PT [facility]   12/10/2020 11:30 AM Zna Rivera, PT [facility]   12/14/2020 11:15 AM Yvette Caldwell, PT [facility]

## 2020-12-03 NOTE — PROGRESS NOTES
Elana Daniel  1960   Chief Complaint   Patient presents with    Knee Pain     right knee pain        HISTORY OF PRESENT ILLNESS  Elana Daniel is a 63 y.o. female who presents today for reevaluation of right knee pain. Patient rates pain as 0/10 today. At last OV on 11/12/2020, patient had a right knee cortisone injection which provided significant relief. Still has some pain with twisting the knee the wrong way but this resolves quickly. Notes overall improvement with the injection. Has also been going to PT with relief. Pt has tried wearing a knee brace. Has also tried taking Tylenol. Pt reports hx of gastric bypass 16 years ago, was also a kidney donor around 6 months ago. Pt is allergic to Iodine. Patient denies any fever, chills, chest pain, shortness of breath or calf pain. The remainder of the review of systems is negative. There are no new illness or injuries to report since last seen in the office. There are no changes to medications, allergies, family or social history. PHYSICAL EXAM:   Visit Vitals  /69 (BP 1 Location: Left arm, BP Patient Position: Sitting)   Pulse 75   Temp 97.5 °F (36.4 °C) (Temporal)   Resp 16   Ht 5' (1.524 m)   Wt 103 lb 3.2 oz (46.8 kg)   SpO2 100%   BMI 20.15 kg/m²     The patient is a well-developed, well-nourished female   in no acute distress. The patient is alert and oriented times three. The patient is alert and oriented times three. Mood and affect are normal.  LYMPHATIC: lymph nodes are not enlarged and are within normal limits  SKIN: normal in color and non tender to palpation. There are no bruises or abrasions noted. NEUROLOGICAL: Motor sensory exam is within normal limits. Reflexes are equal bilaterally.  There is normal sensation to pinprick and light touch  MUSCULOSKELETAL:  Examination Right knee   Skin Intact   Range of motion 0-130   Effusion +   Medial joint line tenderness +   Lateral joint line tenderness -   Tenderness Pes Barry Gurwinder -   Tenderness insertion MCL -   Tenderness insertion LCL -   Jeannes -   Patella crepitus +   Patella grind +   Lachman -   Pivot shift -   Anterior drawer -   Posterior drawer -   Varus stress -   Valgus stress -   Neurovascular Intact   Calf Swelling and Tenderness to Palpation -   Sherin's Test -   Hamstring Cord Tightness -       IMAGING: XR of right knee obtained in the office dated 11/12/2020 was reviewed and read by Dr. Velasco Lesley: Moderate degenerative changes in the medial compartment and patellofemoral joint. IMPRESSION:      ICD-10-CM ICD-9-CM    1. Primary osteoarthritis of right knee  M17.11 715.16         PLAN:   1. Pt presents today with right knee pain due to primary OA and the cortisone injection given at last OV provided significant relief. Has also been attending PT with relief. Can continue with PT and transition to physician-directed exercise program. Can follow up as needed. Risk factors include: no NSAIDS  2. No ultrasound exam indicated today  3. No cortisone injection indicated today   4. No Physical/Occupational Therapy indicated today  5. No diagnostic test indicated today:   6. No durable medical equipment indicated today  7. No referral indicated today   8. No medications indicated today:   9. No Narcotic indicated today       RTC prn      Scribed by [address]) as dictated by Stephanie Lockhart MD    I, Dr. Stephanie Lockhart, confirm that all documentation is accurate.     Stephanie Lockhart M.D.   [facility]

## 2020-12-07 ENCOUNTER — HOSPITAL ENCOUNTER (OUTPATIENT)
Dept: PHYSICAL THERAPY | Age: 60
Discharge: HOME | End: 2020-12-07
Payer: COMMERCIAL

## 2020-12-07 PROCEDURE — 97110 THERAPEUTIC EXERCISES: CPT

## 2020-12-07 PROCEDURE — 97112 NEUROMUSCULAR REEDUCATION: CPT

## 2020-12-07 NOTE — PROGRESS NOTES
PT DAILY TREATMENT NOTE     Patient Name: Dia Half  Date:2020  : 1960  [x]  Patient  Verified  Payor: Mello Mandujano / Plan: [employer]  [facility] / Product Type: Commerical /    In time:11:17  Out time:12:12  Total Treatment Time (min): 54  Visit #: 6  of 8    Treatment Area: Unilateral primary osteoarthritis, right knee [M17.11]    SUBJECTIVE  Pain Level (0-10 scale): 1/10  Any medication changes, allergies to medications, adverse drug reactions, diagnosis change, or new procedure performed?: [x] No    [] Yes (see summary sheet for update)  Subjective functional status/changes:   [] No changes reported  \" Feeling better, less intense symptoms\"    OBJECTIVE    Modality rationale: decrease inflammation and decrease pain to improve the patients ability to transfer and ambulate with ease.    Min Type Additional Details    [] Estim:  []Unatt       []IFC  []Premod                        []Other:  []w/ice   []w/heat  Position:  Location:    [] Estim: []Att    []TENS instruct  []NMES                    []Other:  []w/US   []w/ice   []w/heat  Position:  Location:    []  Traction: [] Cervical       []Lumbar                       [] Prone          []Supine                       []Intermittent   []Continuous Lbs:  [] before manual  [] after manual    []  Ultrasound: []Continuous   [] Pulsed                           []1MHz   []3MHz W/cm2:  Location:    []  Iontophoresis with dexamethasone         Location: [] Take home patch   [] In clinic   10 [x]  Ice     []  heat  []  Ice massage  []  Laser   []  Anodyne Position: Long seated  Location: right Knee    []  Laser with stim  []  Other:  Position:  Location:    []  Vasopneumatic Device Pressure:       [] lo [] med [] hi   Temperature: [] lo [] med [] hi   [] Skin assessment post-treatment:  [x]intact []redness- no adverse reaction    []redness  adverse reaction:     30 min Therapeutic Exercise:  [] See flow sheet :   Rationale: increase ROM and increase strength to improve the patients ability to perform functional motions with ease. 10 min Neuromuscular Re-education:  [x]  See flow sheet :   Rationale: improve balance and increase proprioception  to improve the patients ability to ambulate with improved quad control. With   [] TE   [] TA   [] neuro   [] other: Patient Education: [x] Review HEP    [] Progressed/Changed HEP based on:   [] positioning   [] body mechanics   [] transfers   [] heat/ice application    [] other:      Other Objective/Functional Measures: Gluet Med fatigue with added pulses to SL. Pain Level (0-10 scale) post treatment: 0/10    ASSESSMENT/Changes in Function: Patient demonstrating improved knee alignment with single limb eccentric control. Challenged in [place] with pulses. Patient will continue to benefit from skilled PT services to address functional mobility deficits, address ROM deficits, address strength deficits, analyze and address soft tissue restrictions and analyze and cue movement patterns to attain remaining goals. [x]  See Plan of Care  []  See progress note/recertification  []  See Discharge Summary         Progress towards goals / Updated goals:  Short Term Goals: To be accomplished in 2 weeks:  1.  Patient to be educated and Independent in HEP.             EVAL:  Issued HEP              YLAQSXU: met, reports daily compliance (11/24/2020)  2.  Patient to report no difficulty with walking dog short community distances.               EVAL: Mild difficulty   Current: No difficulty 12/7/2020  Long Term Goals: To be accomplished in 4 weeks:  1.  Patient to report FOTO score of at least 74 to demonstrate functional improvement.              EVAL: FOTO: 68  2.  Patient to report average pain 0-1/10 with work day activities.               EVAL: 3/10  3.  Patient to demonstrate increased hip strength to 5/5 to improve function with yard work/house work.              EVAL: Hip Abd 3+/5  4.  Patient to demonstrate increased functional quad strength to eliminate Right knee crepitus and prevent increased pain response with squatting activities.                EVAL: Fair VMO contraction     PLAN  []  Upgrade activities as tolerated     [x]  Continue plan of care  []  Update interventions per flow sheet       []  Discharge due to:_  []  Other:_      Josh Clark, PT 12/7/2020  11:34 AM    Future Appointments   Date Time Provider Lilliam Smarti   12/10/2020 11:30 AM [facility]   12/14/2020 11:15 AM Velma Caldwell[facility]

## 2020-12-10 ENCOUNTER — HOSPITAL ENCOUNTER (OUTPATIENT)
Dept: PHYSICAL THERAPY | Age: 60
Discharge: HOME | End: 2020-12-10
Payer: COMMERCIAL

## 2020-12-10 PROCEDURE — 97110 THERAPEUTIC EXERCISES: CPT

## 2020-12-10 PROCEDURE — 97112 NEUROMUSCULAR REEDUCATION: CPT

## 2020-12-10 NOTE — PROGRESS NOTES
PT DAILY TREATMENT NOTE     Patient Name: Ashley Paula  Date:12/10/2020  : 1960  [x]  Patient  Verified  Payor: Lavern Chavez / [facility] [employer]  [facility] Type: Commerical /    In time:1132  Out time:1220  Total Treatment Time (min): 48  Visit #: 7 of 8    Treatment Area: Unilateral primary osteoarthritis, right knee [M17.11]    SUBJECTIVE  Pain Level (0-10 scale): 0  Any medication changes, allergies to medications, adverse drug reactions, diagnosis change, or new procedure performed?: [x] No    [] Yes (see summary sheet for update)  Subjective functional status/changes:   [] No changes reported  Reports would like to d/c at next visit. OBJECTIVE    Modality rationale: decrease edema, decrease inflammation and decrease pain to improve the patients ability to manage self care. Min Type Additional Details   10 [x]  Ice     []  heat  []  Ice massage  []  Laser   []  Anodyne Position: reclined  Long sit  Location: left knee   [] Skin assessment post-treatment:  []intact []redness- no adverse reaction    []redness  adverse reaction:     30 min Therapeutic Exercise:  [x] See flow sheet :   Rationale: increase ROM, increase strength and improve coordination to improve the patients ability to perform ADLs. 8 min Neuromuscular Re-education:  [x]  See flow sheet :   Rationale: increase strength, improve coordination, improve balance and increase proprioception  to improve the patients ability to perform functional tasks. With   [] TE   [] TA   [] neuro   [] other: Patient Education: [x] Review HEP    [] Progressed/Changed HEP based on:   [] positioning   [] body mechanics   [] transfers   [] heat/ice application    [] other:      Other Objective/Functional Measures: exercises per flowsheet     Pain Level (0-10 scale) post treatment: 0    ASSESSMENT/Changes in Function: Pt performs all exercises and progressions well and without pain.  Demonstration of squat form shows continued valgus collapse of the right knee, requiring verbal cue from therapist to ER at the knee to improve stability. Anticipate d/c next visit. Patient will continue to benefit from skilled PT services to modify and progress therapeutic interventions, address functional mobility deficits, address ROM deficits, address strength deficits, analyze and address soft tissue restrictions, analyze and cue movement patterns, analyze and modify body mechanics/ergonomics, assess and modify postural abnormalities, address imbalance/dizziness and instruct in home and community integration to attain remaining goals. [x]  See Plan of Care  []  See progress note/recertification  []  See Discharge Summary         Progress towards goals / Updated goals:  Short Term Goals: To be accomplished in 2 weeks:  1.  Patient to be educated and Independent in HEP.             EVAL:  Issued HEP              NXUIZUC: met, reports daily compliance (11/24/2020)  2.  Patient to report no difficulty with walking dog short community distances.               EVAL: Mild difficulty              Current: met, No difficulty 12/7/2020  Long Term Goals: To be accomplished in 4 weeks:  1.  Patient to report FOTO score of at least 74 to demonstrate functional improvement.              EVAL: FOTO: 68  2.  Patient to report average pain 0-1/10 with work day activities.             EVAL: 3/10   Current: met, reports decreased pain average 0/10 with one minor episode of \"popping out zing\" (12/10/2020)  3.  Patient to demonstrate increased hip strength to 5/5 to improve function with yard work/house work.  Patricia Krishnan: Hip Abd 3+/5  4.  Patient to demonstrate increased functional quad strength to eliminate Right knee crepitus and prevent increased pain response with squatting activities.                EVAL: Fair VMO contraction    Current: met, resolution of crepitus, improved quadricep strength (12/10/2020)    PLAN  []  Upgrade activities as tolerated     [x]  Continue plan of care  []  Update interventions per flow sheet       []  Discharge due to:_  []  Other:_      Herminia Macias PT 12/10/2020  11:41 AM    Future Appointments   Date Time Provider Lilliam Byrnes   12/14/2020 11:15 AM Kacy Gamble, PT [id number]

## 2020-12-14 ENCOUNTER — HOSPITAL ENCOUNTER (OUTPATIENT)
Dept: PHYSICAL THERAPY | Age: 60
Discharge: HOME | End: 2020-12-14
Payer: COMMERCIAL

## 2020-12-14 PROCEDURE — 97110 THERAPEUTIC EXERCISES: CPT

## 2020-12-14 PROCEDURE — 97112 NEUROMUSCULAR REEDUCATION: CPT

## 2020-12-14 NOTE — PROGRESS NOTES
PT DISCHARGE DAILY NOTE AND [id number]    Patient name: Joaquim Maloney Start of Care: 2020   Referral source: Vin Lebron,* : 1960   Medical/Treatment Diagnosis: Unilateral primary osteoarthritis, right knee [M17.11] Onset Date:2019     Prior Hospitalization: see medical history Provider#: [id number]   Medications: Verified on Patient Summary List    Comorbidities: Arthritis, Headaches, Kidney donation surgery, sleep dysfunction, Gastric Bypass Surgery  Prior Level of Function: Independent    Visits from Start of Care: 8    Missed Visits: 0    Reporting Period : 2020 to 2020    Date:2020  : 1960  [x]  Patient  Verified  Payor: Nirav Garrido / Plan: [employer] PT / Product Type: Commerical /    In time:1115 Out time:1205  Total Treatment Time (min): 50  Visit #: 8 of 8    SUBJECTIVE  Pain Level (0-10 scale): 0/10  Any medication changes, allergies to medications, adverse drug reactions, diagnosis change, or new procedure performed?: [x] No    [] Yes (see summary sheet for update)  Subjective functional status/changes:   [] No changes reported  \" Doing much better, feeling a lot stronger\"    OBJECTIVE    Modality rationale: decrease pain and increase tissue extensibility to improve the patients ability to ambulate and squat without limitations.     Min Type Additional Details    [] Estim:  []Unatt       []IFC  []Premod                        []Other:  []w/ice   []w/heat  Position:  Location:    [] Estim: []Att    []TENS instruct  []NMES                    []Other:  []w/US   []w/ice   []w/heat  Position:  Location:    []  Traction: [] Cervical       []Lumbar                       [] Prone          []Supine                       []Intermittent   []Continuous Lbs:  [] before manual  [] after manual    []  Ultrasound: []Continuous   [] Pulsed                           []1MHz   []3MHz W/cm2:  Location:    []  Iontophoresis with dexamethasone         Location: [] Take home patch   [] In clinic   10 [x]  Ice     []  heat  []  Ice massage  []  Laser   []  Anodyne Position: Long sit  Location: Right knee pain    []  Laser with stim  []  Other:  Position:  Location:    []  Vasopneumatic Device Pressure:       [] lo [] med [] hi   Temperature: [] lo [] med [] hi   [x] Skin assessment post-treatment:  [x]intact []redness- no adverse reaction    []redness  adverse reaction:       30 min Therapeutic Exercise:  [x] See flow sheet :   Rationale: increase ROM and increase strength to improve the patients ability to improve ability to perform ADL's. 10 min Neuromuscular Re-education:  [x]  See flow sheet :   Rationale: improve coordination, improve balance and increase proprioception  to improve the patients ability to ambulate without limitations. With   [x] TE   [] TA   [x] neuro   [] other: Patient Education: [x] Review HEP    [] Progressed/Changed HEP based on:   [] positioning   [] body mechanics   [] transfers   [] heat/ice application    [] other:      Other Objective/Functional Measures: Gluet Med 4-/5, Single leg balance 30 sec straight leg, 15 sec with knee flexion. Pain Level (0-10 scale) post treatment: 0/10    Summary of Care:  1.  Patient to be educated and Independent in [address] [place] [address].             EVAL:  Issued HEP              WX: MET  2.  Patient to report no difficulty with walking dog short community distances.               EVAL: Mild difficulty              DC: Met, No difficulty   Long Term Goals: To be accomplished in 4 weeks:  1.  Patient to report FOTO score of at least 74 to demonstrate functional improvement.              EVAL: FOTO: 68   DC: 91  2.  Patient to report average pain 0-1/10 with work day activities.               EVAL: 3/10              DC: Met, No episodes of knee popping  3.  Patient to demonstrate increased hip strength to 5/5 to improve function with yard work/house work.              EVAL: Hip Abd 3+/5   D/C: Left Hip Abd 4-/5  4.  Patient to demonstrate increased functional quad strength to eliminate Right knee crepitus and prevent increased pain response with squatting activities.                EVAL: Fair VMO contraction               DC: MET, resolution of crepitus, improved quadricep strength     ASSESSMENT/Changes in Function:   Patient has progressed will with PT. Her FOTO score increased from 69 to 91 exceeded expectations. She does demonstrate decreased strength in pelvis, but is mindful of positioning and alignment with exercise. She is no longer experiencing the symptoms that brought he into PT and is Independent with HEP. She will continue to benefit from performing her HEP and can be Discharged at this time.      Thank you for this referral!      PLAN  [x]Discontinue therapy: []Patient has reached or is progressing toward set goals      []Patient is non-compliant or has abdicated      []Due to lack of appreciable progress towards set goals    [facility], PT 12/14/2020  9:05 AM